# Patient Record
Sex: MALE | Race: WHITE | NOT HISPANIC OR LATINO | Employment: OTHER | URBAN - NONMETROPOLITAN AREA
[De-identification: names, ages, dates, MRNs, and addresses within clinical notes are randomized per-mention and may not be internally consistent; named-entity substitution may affect disease eponyms.]

---

## 2021-10-11 ENCOUNTER — OFFICE VISIT (OUTPATIENT)
Dept: UROLOGY | Facility: CLINIC | Age: 66
End: 2021-10-11

## 2021-10-11 VITALS
HEIGHT: 71 IN | DIASTOLIC BLOOD PRESSURE: 80 MMHG | BODY MASS INDEX: 29.96 KG/M2 | SYSTOLIC BLOOD PRESSURE: 150 MMHG | WEIGHT: 214 LBS

## 2021-10-11 DIAGNOSIS — R35.0 FREQUENCY OF MICTURITION: Primary | ICD-10-CM

## 2021-10-11 DIAGNOSIS — R33.9 INCOMPLETE BLADDER EMPTYING: ICD-10-CM

## 2021-10-11 DIAGNOSIS — N52.9 ERECTILE DYSFUNCTION, UNSPECIFIED ERECTILE DYSFUNCTION TYPE: ICD-10-CM

## 2021-10-11 LAB
BILIRUB BLD-MCNC: NEGATIVE MG/DL
CLARITY, POC: CLEAR
COLOR UR: YELLOW
EXPIRATION DATE: NORMAL
GLUCOSE UR STRIP-MCNC: NEGATIVE MG/DL
KETONES UR QL: NEGATIVE
LEUKOCYTE EST, POC: NEGATIVE
Lab: NORMAL
NITRITE UR-MCNC: NEGATIVE MG/ML
PH UR: 6 [PH] (ref 5–8)
PROT UR STRIP-MCNC: NEGATIVE MG/DL
RBC # UR STRIP: NEGATIVE /UL
SP GR UR: 1.01 (ref 1–1.03)
UROBILINOGEN UR QL: NORMAL

## 2021-10-11 PROCEDURE — 81003 URINALYSIS AUTO W/O SCOPE: CPT | Performed by: UROLOGY

## 2021-10-11 PROCEDURE — 99203 OFFICE O/P NEW LOW 30 MIN: CPT | Performed by: UROLOGY

## 2021-10-11 PROCEDURE — 51798 US URINE CAPACITY MEASURE: CPT | Performed by: UROLOGY

## 2021-10-11 RX ORDER — TOPIRAMATE 50 MG/1
TABLET, FILM COATED ORAL
COMMUNITY
Start: 2021-08-11

## 2021-10-11 RX ORDER — TAMSULOSIN HYDROCHLORIDE 0.4 MG/1
2 CAPSULE ORAL NIGHTLY
COMMUNITY
Start: 2021-07-14 | End: 2021-10-11 | Stop reason: SDUPTHER

## 2021-10-11 RX ORDER — FINASTERIDE 5 MG/1
5 TABLET, FILM COATED ORAL DAILY
Qty: 90 TABLET | Refills: 3 | Status: SHIPPED | OUTPATIENT
Start: 2021-10-11 | End: 2022-10-06

## 2021-10-11 RX ORDER — LEVOTHYROXINE SODIUM 175 UG/1
175 TABLET ORAL DAILY
COMMUNITY
Start: 2021-10-04

## 2021-10-11 RX ORDER — FINASTERIDE 5 MG/1
5 TABLET, FILM COATED ORAL DAILY
COMMUNITY
Start: 2021-07-14 | End: 2021-10-11 | Stop reason: SDUPTHER

## 2021-10-11 RX ORDER — SUMATRIPTAN 100 MG/1
TABLET, FILM COATED ORAL AS NEEDED
COMMUNITY
Start: 2021-07-16

## 2021-10-11 RX ORDER — TAMSULOSIN HYDROCHLORIDE 0.4 MG/1
2 CAPSULE ORAL NIGHTLY
Qty: 180 CAPSULE | Refills: 3 | Status: SHIPPED | OUTPATIENT
Start: 2021-10-11 | End: 2022-10-06

## 2021-10-11 RX ORDER — TADALAFIL 20 MG/1
20 TABLET ORAL AS NEEDED
Qty: 20 TABLET | Refills: 3 | Status: ON HOLD | OUTPATIENT
Start: 2021-10-11 | End: 2022-10-20

## 2021-10-11 NOTE — PROGRESS NOTES
LUTS Male Office Visit      Patient Name: Ivan Serrano  : 1955   MRN: [id number]     Chief Complaint:  Lower Urinary Tract Symptoms.   Chief Complaint   Patient presents with   • Benign Prostatic Hypertrophy     New pt        Referring Provider: No ref. provider found    History of Present Illness: Mr. Serrano is a 66 y.o. male with history of lower urinary tract symptoms. He underwent Urolift with me in 2019.  He did well initially but had what is likely a pull through.  He had irritative and obstructive symptoms again.  He then started on myrbetriq, tamsulosin, and finasteride.  This combination has worked well for him.  During his last visit with me at  were were debating a TURP.  However, he reports he is doing so much better and he would like to keep his meds up for now.     Of note, he was found to have a bleeding disorder and was advised to take Amicar prior to any procedures.       Previous treatments include: Tamsulosin, finasteride, UROLIFT.     Subjective      Review of System: Review of Systems   Constitutional: Negative for chills, fatigue, fever and unexpected weight change.   HENT: Negative for congestion, rhinorrhea and sore throat.    Eyes: Negative for visual disturbance.   Respiratory: Negative for apnea, cough, chest tightness and shortness of breath.    Cardiovascular: Negative for chest pain.   Gastrointestinal: Negative for abdominal pain, constipation, diarrhea, nausea and vomiting.   Endocrine: Negative for polydipsia and polyuria.   Genitourinary: Negative for difficulty urinating, dysuria, enuresis, flank pain, frequency, genital sores, hematuria and urgency.   Musculoskeletal: Negative for gait problem.   Skin: Negative for rash and wound.   Allergic/Immunologic: Negative for immunocompromised state.   Neurological: Negative for dizziness, tremors, syncope, weakness and light-headedness.   Hematological: Does not bruise/bleed easily.      I have reviewed the ROS  documented by my clinical staff, I have updated appropriately and I agree. Susanne Koch MD    Past Medical History:  Past Medical History:   Diagnosis Date   • BPH (benign prostatic hyperplasia)    • Chronic prostatitis    • Urgency of urination        Past Surgical History:  History reviewed. No pertinent surgical history.    Medications:    Current Outpatient Medications:   •  finasteride (PROSCAR) 5 MG tablet, Take 1 tablet by mouth Daily for 360 days., Disp: 90 tablet, Rfl: 3  •  levothyroxine (SYNTHROID, LEVOTHROID) 175 MCG tablet, Take 175 mcg by mouth Daily., Disp: , Rfl:   •  SUMAtriptan (IMITREX) 100 MG tablet, As Needed., Disp: , Rfl:   •  tamsulosin (FLOMAX) 0.4 MG capsule 24 hr capsule, Take 2 capsules by mouth Every Night for 360 days., Disp: 180 capsule, Rfl: 3  •  topiramate (TOPAMAX) 50 MG tablet, 100 mg am 50 pm, Disp: , Rfl:   •  Mirabegron ER (MYRBETRIQ) 50 MG tablet sustained-release 24 hour 24 hr tablet, Take 50 mg by mouth Daily for 360 days., Disp: 90 tablet, Rfl: 4  •  tadalafil (Cialis) 20 MG tablet, Take 1 tablet by mouth As Needed for Erectile Dysfunction for up to 90 days., Disp: 20 tablet, Rfl: 3    Allergies:  No Known Allergies    Social History:  Social History     Socioeconomic History   • Marital status: Unknown   Tobacco Use   • Smoking status: Never Smoker   • Smokeless tobacco: Never Used   Substance and Sexual Activity   • Alcohol use: Never   • Drug use: Never       Family History:  Family History   Problem Relation Age of Onset   • No Known Problems Father    • No Known Problems Mother        IPSS Questionnaire (AUA-7):  Over the past month…    1)  Incomplete Emptying  How often have you had a sensation of not emptying your bladder?  1 - Less than 1 time in 5   2)  Frequency  How often have you had to urinate less than every two hours? 2 - Less than half the time   3)  Intermittency  How often have you found you stopped and started again several times when you urinated?  3  "- About half the time   4) Urgency  How often have you found it difficult to postpone urination?  2 - Less than half the time   5) Weak Stream  How often have you had a weak urinary stream?  3 - About half the time   6) Straining  How often have you had to push or strain to begin urination?  0 - Not at all   7) Nocturia  How many times did you typically get up at night to urinate?  1 - 1 time   Total Score:  12   The International Prostate Symptom Score (IPSS) is used to screen, diagnose, track symptoms of benign prostatic hyperplasia (BPH).    0-7 pts (Mild Symptoms)  / 8-19 pts (Moderate) / 20-35 (Severe)    Quality of life due to urinary symptoms:  If you were to spend the rest of your life with your urinary condition the way it is now, how would you feel about that? 1-PleasedP   Urine Leakage (Incontinence) 0-No Leakage       Post void residual bladder scan:   0 mL     Objective     Physical Exam:   Vital Signs:   Vitals:    10/11/21 1333   BP: 150/80   Weight: 97.1 kg (214 lb)   Height: 180.3 cm (71\")     Body mass index is 29.85 kg/m².     Physical Exam  Vitals and nursing note reviewed.   Constitutional:       General: He is awake. He is not in acute distress.     Appearance: Normal appearance. He is well-developed.   HENT:      Head: Normocephalic and atraumatic.      Right Ear: External ear normal.      Left Ear: External ear normal.      Nose: Nose normal.   Eyes:      Conjunctiva/sclera: Conjunctivae normal.   Pulmonary:      Effort: Pulmonary effort is normal.   Abdominal:      General: There is no distension.      Palpations: Abdomen is soft. There is no mass.      Tenderness: There is no abdominal tenderness. There is no right CVA tenderness, left CVA tenderness, guarding or rebound.      Hernia: No hernia is present. There is no hernia in the left inguinal area or right inguinal area.   Genitourinary:     Pubic Area: No rash.       Rectum: No mass or tenderness. Normal anal tone.   Musculoskeletal:    "   Cervical back: Normal range of motion.   Lymphadenopathy:      Lower Body: No right inguinal adenopathy. No left inguinal adenopathy.   Skin:     General: Skin is warm.   Neurological:      General: No focal deficit present.      Mental Status: He is alert and oriented to person, place, and time.   Psychiatric:         Behavior: Behavior normal. Behavior is cooperative.         Labs:   No results found for: PSA    Brief Urine Lab Results  (Last result in the past 365 days)      Color   Clarity   Blood   Leuk Est   Nitrite   Protein   CREAT   Urine HCG        10/11/21 1345 Yellow   Clear   Negative   Negative   Negative   Negative                      No results found for: GLUCOSE, CALCIUM, NA, K, CO2, CL, BUN, CREATININE, EGFRIFAFRI, EGFRIFNONA, BCR, ANIONGAP    No results found for: WBC, HGB, HCT, MCV, PLT    Images:   No Images in the past 120 days found..    Measures:   Tobacco:   Ivan Serrano  reports that he has never smoked. He has never used smokeless tobacco.  No tobacco use.t.    Urine Incontinence: ( NOUI)  Urge incontinence controlled with myrbetriq     Assessment / Plan      Assessment:  Mr. Serrano is a 66 y.o. male who presented today with lower urinary tract symptoms.  He underwent UroLift in November 2019.  Since then he has been on tamsulosin, finasteride, and Myrbetriq.  He also underwent an MRI of the prostate and was found to have a 96 g prostate based off of MRI volume.  He has done very well on his medications and is very happy with his symptoms as of now.  He would like to avoid surgery at this point.  I have renewed his medications and will have him follow-up in 6 months.  If he decides to change his mind or if his symptoms change he will call and come back sooner.  I will start him on Cialis for ED.      Diagnoses and all orders for this visit:    1. Frequency of micturition (Primary)  -     POC Urinalysis Dipstick, Automated  -     Discontinue: Mirabegron ER (MYRBETRIQ) 50 MG tablet  sustained-release 24 hour 24 hr tablet; Take 50 mg by mouth Daily for 360 days.  Dispense: 90 tablet; Refill: 3    2. Incomplete bladder emptying  -     Bladder Scan  -     tadalafil (Cialis) 20 MG tablet; Take 1 tablet by mouth As Needed for Erectile Dysfunction for up to 90 days.  Dispense: 20 tablet; Refill: 3  -     tamsulosin (FLOMAX) 0.4 MG capsule 24 hr capsule; Take 2 capsules by mouth Every Night for 360 days.  Dispense: 180 capsule; Refill: 3  -     Discontinue: Mirabegron ER (MYRBETRIQ) 50 MG tablet sustained-release 24 hour 24 hr tablet; Take 50 mg by mouth Daily for 360 days.  Dispense: 90 tablet; Refill: 3  -     finasteride (PROSCAR) 5 MG tablet; Take 1 tablet by mouth Daily for 360 days.  Dispense: 90 tablet; Refill: 3    3. Erectile dysfunction, unspecified erectile dysfunction type  -     tadalafil (Cialis) 20 MG tablet; Take 1 tablet by mouth As Needed for Erectile Dysfunction for up to 90 days.  Dispense: 20 tablet; Refill: 3         Follow Up:   Return in about 6 months (around 4/11/2022) for Recheck.    I spent approximately 30 minutes providing clinical care for this patient; including review of patient's chart and provider documentation, face to face time spent with patient in examination room (obtaining history, performing physical exam, discussing diagnosis and management options), placing orders, and completing patient documentation.     Susanne Koch MD  [facility]

## 2021-10-14 ENCOUNTER — TELEPHONE (OUTPATIENT)
Dept: UROLOGY | Facility: CLINIC | Age: 66
End: 2021-10-14

## 2021-10-14 DIAGNOSIS — R35.0 FREQUENCY OF MICTURITION: ICD-10-CM

## 2021-10-14 DIAGNOSIS — R33.9 INCOMPLETE BLADDER EMPTYING: ICD-10-CM

## 2022-04-27 ENCOUNTER — OFFICE VISIT (OUTPATIENT)
Dept: UROLOGY | Facility: CLINIC | Age: 67
End: 2022-04-27

## 2022-04-27 VITALS — BODY MASS INDEX: 29.96 KG/M2 | WEIGHT: 214 LBS | HEIGHT: 71 IN

## 2022-04-27 DIAGNOSIS — R33.9 INCOMPLETE BLADDER EMPTYING: Primary | ICD-10-CM

## 2022-04-27 DIAGNOSIS — R35.0 FREQUENCY OF MICTURITION: ICD-10-CM

## 2022-04-27 LAB
BILIRUB BLD-MCNC: NEGATIVE MG/DL
CLARITY, POC: CLEAR
COLOR UR: YELLOW
EXPIRATION DATE: NORMAL
GLUCOSE UR STRIP-MCNC: NEGATIVE MG/DL
KETONES UR QL: NEGATIVE
LEUKOCYTE EST, POC: NEGATIVE
Lab: NORMAL
NITRITE UR-MCNC: NEGATIVE MG/ML
PH UR: 7.5 [PH] (ref 5–8)
PROT UR STRIP-MCNC: NEGATIVE MG/DL
RBC # UR STRIP: NEGATIVE /UL
SP GR UR: 1.01 (ref 1–1.03)
UROBILINOGEN UR QL: NORMAL

## 2022-04-27 PROCEDURE — 51798 US URINE CAPACITY MEASURE: CPT | Performed by: UROLOGY

## 2022-04-27 PROCEDURE — 81003 URINALYSIS AUTO W/O SCOPE: CPT | Performed by: UROLOGY

## 2022-04-27 PROCEDURE — 99215 OFFICE O/P EST HI 40 MIN: CPT | Performed by: UROLOGY

## 2022-04-27 RX ORDER — SOLIFENACIN SUCCINATE 5 MG/1
5 TABLET, FILM COATED ORAL DAILY
Qty: 30 TABLET | Refills: 2 | Status: SHIPPED | OUTPATIENT
Start: 2022-04-27 | End: 2022-05-10 | Stop reason: SDUPTHER

## 2022-04-27 NOTE — PROGRESS NOTES
Follow Up Office Visit      Patient Name: Ivan Serrano  : 1955   MRN: [id number]     Chief Complaint:    Chief Complaint   Patient presents with   • Benign Prostatic Hypertrophy     6 month f/u       Referring Provider: No ref. provider found    History of Present Illness: Ivan Serrano is a 67 y.o. male who presents today for follow up of lower urinary tract symptoms.  He reports he is overall doing well but has noticed his urgency has gotten worse.  He reports he goes to the bathroom once/hour.  He reports the myrbetriq has helped tremendously.  No dysuria or gross hematuria.  He has occasional urge incontinence.  No pads.   He had a PSA checked 2021 and was 1.79.  This was on finasteride.    He had an MRI of the prostate which showed a 96g prostate.    He has had 2 negative biopsies in the past.    His obstructive symptoms are improved and he is doing well from that standpoint.     Subjective      Review of System: Review of Systems   Constitutional: Negative for chills and fever.   HENT: Negative for sinus pain.    Respiratory: Negative for cough.    Cardiovascular: Negative for leg swelling.   Gastrointestinal: Negative for abdominal pain.   Genitourinary: Positive for frequency and urgency. Negative for dysuria and hematuria.   Musculoskeletal: Negative for back pain.   Neurological: Negative for headaches.   Psychiatric/Behavioral: The patient is not nervous/anxious.       I have reviewed the ROS documented by my clinical staff, I have updated appropriately and I agree. Susanne Koch MD    I have reviewed and the following portions of the patient's history were updated as appropriate: past family history, past medical history, past social history, past surgical history and problem list.    Past Medical History:   Past Medical History:   Diagnosis Date   • BPH (benign prostatic hyperplasia)    • Chronic prostatitis    • Urgency of urination        Past Surgical History:  History reviewed. No  pertinent surgical history.    Family History:   family history includes No Known Problems in his father and mother.   Otherwise pertinent FHx was reviewed and not pertinent to current issue.    Social History:    reports that he has never smoked. He has never used smokeless tobacco. He reports that he does not drink alcohol and does not use drugs.    Medications:     Current Outpatient Medications:   •  finasteride (PROSCAR) 5 MG tablet, Take 1 tablet by mouth Daily for 360 days., Disp: 90 tablet, Rfl: 3  •  levothyroxine (SYNTHROID, LEVOTHROID) 175 MCG tablet, Take 175 mcg by mouth Daily., Disp: , Rfl:   •  Mirabegron ER (MYRBETRIQ) 50 MG tablet sustained-release 24 hour 24 hr tablet, Take 50 mg by mouth Daily for 360 days., Disp: 90 tablet, Rfl: 4  •  SUMAtriptan (IMITREX) 100 MG tablet, As Needed., Disp: , Rfl:   •  tamsulosin (FLOMAX) 0.4 MG capsule 24 hr capsule, Take 2 capsules by mouth Every Night for 360 days., Disp: 180 capsule, Rfl: 3  •  topiramate (TOPAMAX) 50 MG tablet, 100 mg am 50 pm, Disp: , Rfl:   •  solifenacin (VESICARE) 5 MG tablet, Take 1 tablet by mouth Daily., Disp: 30 tablet, Rfl: 2  •  tadalafil (Cialis) 20 MG tablet, Take 1 tablet by mouth As Needed for Erectile Dysfunction for up to 90 days., Disp: 20 tablet, Rfl: 3    Allergies:   No Known Allergies    IPSS Questionnaire (AUA-7):  Over the past month…    1)  Incomplete Emptying  How often have you had a sensation of not emptying your bladder?  1 - Less than 1 time in 5   2)  Frequency  How often have you had to urinate less than every two hours? 5 - Almost always   3)  Intermittency  How often have you found you stopped and started again several times when you urinated?  2 - Less than half the time   4) Urgency  How often have you found it difficult to postpone urination?  5 - Almost always   5) Weak Stream  How often have you had a weak urinary stream?  1 - Less than 1 time in 5   6) Straining  How often have you had to push or strain  "to begin urination?  0 - Not at all   7) Nocturia  How many times did you typically get up at night to urinate?  1 - 1 time   Total Score:  15   The International Prostate Symptom Score (IPSS) is used to screen, diagnose, track symptoms of benign prostatic hyperplasia (BPH).    0-7 pts (Mild Symptoms)  / 8-19 pts (Moderate) / 20-35 (Severe)    Quality of life due to urinary symptoms:  If you were to spend the rest of your life with your urinary condition the way it is now, how would you feel about that? 3-Mixed   Urine Leakage (Incontinence) 1-Mild (A few drops a day, no pad use)       Post void residual bladder scan:   1 mL     Objective     Physical Exam:   Vital Signs:   Vitals:    04/27/22 1331   Weight: 97.1 kg (214 lb)   Height: 180.3 cm (71\")     Body mass index is 29.85 kg/m².     Physical Exam  Vitals and nursing note reviewed.   Constitutional:       General: He is awake. He is not in acute distress.     Appearance: Normal appearance. He is well-developed. He is obese.   HENT:      Head: Normocephalic and atraumatic.      Right Ear: External ear normal.      Left Ear: External ear normal.      Nose: Nose normal.   Eyes:      Conjunctiva/sclera: Conjunctivae normal.   Pulmonary:      Effort: Pulmonary effort is normal.   Abdominal:      General: There is no distension.      Palpations: Abdomen is soft. There is no mass.      Tenderness: There is no abdominal tenderness. There is no right CVA tenderness, left CVA tenderness, guarding or rebound.      Hernia: No hernia is present. There is no hernia in the left inguinal area or right inguinal area.   Genitourinary:     Pubic Area: No rash.       Rectum: No mass or tenderness. Normal anal tone.   Musculoskeletal:      Cervical back: Normal range of motion.   Lymphadenopathy:      Lower Body: No right inguinal adenopathy. No left inguinal adenopathy.   Skin:     General: Skin is warm.   Neurological:      General: No focal deficit present.      Mental Status: " He is alert and oriented to person, place, and time.   Psychiatric:         Behavior: Behavior normal. Behavior is cooperative.         Labs:   Brief Urine Lab Results  (Last result in the past 365 days)      Color   Clarity   Blood   Leuk Est   Nitrite   Protein   CREAT   Urine HCG        04/27/22 1446 Yellow   Clear   Negative   Negative   Negative   Negative                      Lab Results   Component Value Date    GLUCOSE 168 (H) 01/04/2022    CALCIUM 9.2 01/13/2022     01/13/2022    K 4.5 01/13/2022    CO2 26 01/13/2022     01/13/2022    BUN 18 01/13/2022    CREATININE 0.97 01/13/2022    EGFRIFAFRI >60 01/13/2022    EGFRIFNONA >60 01/13/2022    BCR 19 01/13/2022    ANIONGAP 8 01/13/2022       Lab Results   Component Value Date    WBC 6.43 01/13/2022    HGB 12.4 (L) 01/13/2022    HCT 38.0 (L) 01/13/2022    MCV 89 01/13/2022     01/13/2022       Lab Results   Component Value Date    PSA 1.79 09/21/2021       Images:   XR Chest 1 View    Result Date: 1/4/2022  Stable exam. CRITICAL RESULT:   No. COMMUNICATION: Per this written report. Signed by Roman Coleman on  1/4/2022 8:52 AM    XR Chest 1 View    Result Date: 1/2/2022  Persistent views bilateral lung airspace disease, appears to be worsening. CRITICAL RESULT:   No. COMMUNICATION: Per this written report. Signed by Brayan Terrazas on  1/2/2022 11:15 AM    XR Chest 1 View    Result Date: 12/31/2021  Bilateral diffuse multifocal hazy opacities, representing infectious process. Small left pleural effusion. CRITICAL RESULT:   No. COMMUNICATION: Per this written report. Dictated by Joyce Julio on 12/31/2021 2:13 AM By electronically signing this report, I, the attending physician, attest that I have personally reviewed the images/data for the above examination(s) and agree with the final edited report. Signed by Quintin Garduno on  12/31/2021 2:54 AM    CT Angiogram Chest Pulmonary Embolism    Result Date: 12/31/2021  No pulmonary embolism.  Diffuse multifocal bilateral airspace disease, compatible with infectious process, such as Covid. CRITICAL RESULT:   No. COMMUNICATION: Per this written report. Signed by Brayan Terrazas on  12/31/2021 10:18 AM      Pathology:      Measures:   Tobacco:   Ivan Serrano  reports that he has never smoked. He has never used smokeless tobacco..     Urine Incontinence: Patient reports that he is not currently experiencing any symptoms of urinary incontinence.        Assessment / Plan      Assessment/Plan:   67 y.o. male who presented today for follow up of his LUTS.  He is emptying his bladder well and states he is doing well from his obstructive symptoms.  I discussed his options with him today which included adding a anticholinergic for Botox for overactive bladder.  I did discuss the risks associated with this which included urinary retention.  He is emptying his bladder well and states that his stream is strong.  However, on cystoscopy he did have coaptation of his bladder.  If he is to undergo an outlet procedure I would recommend thulium enucleation given his significant bleeding risk.  After hearing the risks and benefits he would like to try an anticholinergic first.  We will start him on Vesicare 5 mg.  I will have him follow-up in 4 months in [place] to see how he is doing.  I advised him that should he be unable to void he should present to the emergency room for catheter placement and stop his Myrbetriq and Vesicare.    Diagnoses and all orders for this visit:    1. Incomplete bladder emptying (Primary)  -     Bladder Scan    2. Frequency of micturition  -     POC Urinalysis Dipstick, Automated  -     solifenacin (VESICARE) 5 MG tablet; Take 1 tablet by mouth Daily.  Dispense: 30 tablet; Refill: 2            Follow Up:   No follow-ups on file.    I spent approximately 40 minutes providing clinical care for this patient; including review of patient's chart and provider documentation, face to face time spent  with patient in examination room (obtaining history, performing physical exam, discussing diagnosis and management options), placing orders, and completing patient documentation.     Susanne Koch MD  [facility]

## 2022-05-10 ENCOUNTER — TELEPHONE (OUTPATIENT)
Dept: UROLOGY | Facility: CLINIC | Age: 67
End: 2022-05-10

## 2022-05-10 DIAGNOSIS — R35.0 FREQUENCY OF MICTURITION: ICD-10-CM

## 2022-05-10 RX ORDER — SOLIFENACIN SUCCINATE 5 MG/1
5 TABLET, FILM COATED ORAL DAILY
Qty: 90 TABLET | Refills: 1 | Status: SHIPPED | OUTPATIENT
Start: 2022-05-10 | End: 2022-05-11 | Stop reason: SDUPTHER

## 2022-05-10 NOTE — TELEPHONE ENCOUNTER
Sent in #90 on her Vesicare to [facility], routed message to Dr Koch to verify timing and to potentially put a referral

## 2022-05-10 NOTE — TELEPHONE ENCOUNTER
Caller: TIFFANY RODRIGUEZ    Relationship to patient: SELF    Best call back number: [phone]    Patient is needing: PT STATES THAT [facility] SAYS THEY STILL HAVE NOT RECEIVED THE PRESCRIPTION FOR solifenacin (VESICARE) 5 MG tablet  PT STATES HE BELIEVES PRESCRIPTIONS HAVE TO BE FOR 90 DAY SUPPLY, NOT 30.    ALSO, PT STATES DR KNAPP TOLD HIM HE WILL BE IN [place] IN 6 MONTHS AND THAT SOMEONE WOULD CONTACT HIM TO GET SCHEDULED FOR FOLLOW UP IN [place]. PROG NOTE 4/27/22 STATES SEE HIM IN [place] IN 4 MONTHS.

## 2022-05-11 DIAGNOSIS — N32.81 OAB (OVERACTIVE BLADDER): Primary | ICD-10-CM

## 2022-05-11 RX ORDER — SOLIFENACIN SUCCINATE 5 MG/1
5 TABLET, FILM COATED ORAL DAILY
Qty: 90 TABLET | Refills: 3 | Status: SHIPPED | OUTPATIENT
Start: 2022-05-11 | End: 2022-05-11

## 2022-05-11 RX ORDER — SOLIFENACIN SUCCINATE 5 MG/1
5 TABLET, FILM COATED ORAL DAILY
Qty: 90 TABLET | Refills: 3 | Status: ON HOLD | OUTPATIENT
Start: 2022-05-11 | End: 2022-10-20

## 2022-05-11 NOTE — TELEPHONE ENCOUNTER
Notified pt of Dr Ying message - LM - As soon as they make templates for me in [place] they'll call him for his appointment.

## 2022-05-12 ENCOUNTER — PRIOR AUTHORIZATION (OUTPATIENT)
Dept: UROLOGY | Facility: CLINIC | Age: 67
End: 2022-05-12

## 2022-05-12 ENCOUNTER — TELEPHONE (OUTPATIENT)
Dept: UROLOGY | Facility: CLINIC | Age: 67
End: 2022-05-12

## 2022-05-12 NOTE — TELEPHONE ENCOUNTER
Pt is not covered by the insurance thru covermymeds - pt should not need a PA for this thru his current insurance

## 2022-10-07 NOTE — H&P (VIEW-ONLY)
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    1955  Subjective:      Chief Complaint/Reason for visit:    Chief Complaint   Patient presents with   • Shortness of Breath   • Irregular Heart Beat   • Angioedema       Problem List:  1. Abnormal Echocardiogram 09/27/22, PANTERA Chahal MD revealing mildly dilated LV with EF 35 %, mild AR, trace to mjld MR.  2. Peripheral Edema/Shortness of breath, palpitations.  3. COVID 19- January 2022 - high flow O2  4. Chest Pain  5.. Hypothyroidism  6. BPH  7. Low Back Pain  8.. Migraine Headaches  9. Platelet Aggregation.  10. Obstructive Sleep Apnea- dental device.  11, Chronic Anemia    HPI: Mr. Serrano is a 67 yr old white male who presents in consultation today at the request of Kira VELÁZQUEZ for further evaluation of his abnormal echocardiogram revealing an EF of 35 %.  He was seen by Kira recently with new symptoms of lower extremity edema, palpitations and chest pain.  He was hospitalized in January with COVID and was in the ICU for 8 days.  Since his discharge he has noticed symptoms of chest pain with square dancing, shortness of breath, fatigue, lower extremity edema and daily episodes of palpitations.  He started noticing these symptoms 2 months ago and became concerned when the edema worsened.  He states that if he has chest discomfort with exertion, it resolves with rest.    Social History     Socioeconomic History   • Marital status:    Tobacco Use   • Smoking status: Never   • Smokeless tobacco: Never   Substance and Sexual Activity   • Alcohol use: Never   • Drug use: Never       Family history of CAD in his father.  Mother still living at 94.     has a past medical history of Anemia, BPH (benign prostatic hyperplasia), Chronic prostatitis, GERD (gastroesophageal reflux disease), Hypothyroidism, and Urgency of  "urination.    No Known Allergies      Current Outpatient Medications:   •  finasteride (PROSCAR) 5 MG tablet, Take 1 tablet by mouth Daily., Disp: , Rfl:   •  levothyroxine (SYNTHROID, LEVOTHROID) 175 MCG tablet, Take 175 mcg by mouth Daily., Disp: , Rfl:   •  Mirabegron ER (MYRBETRIQ) 50 MG tablet sustained-release 24 hour 24 hr tablet, Take 50 mg by mouth Daily., Disp: , Rfl:   •  solifenacin (VESICARE) 5 MG tablet, Take 1 tablet by mouth Daily for 360 days., Disp: 90 tablet, Rfl: 3  •  SUMAtriptan (IMITREX) 100 MG tablet, As Needed., Disp: , Rfl:   •  tadalafil (Cialis) 20 MG tablet, Take 1 tablet by mouth As Needed for Erectile Dysfunction for up to 90 days., Disp: 20 tablet, Rfl: 3  •  tamsulosin (FLOMAX) 0.4 MG capsule 24 hr capsule, Take 1 capsule by mouth Daily., Disp: , Rfl:   •  topiramate (TOPAMAX) 50 MG tablet, 100 mg am 50 pm, Disp: , Rfl:   •  carvedilol (COREG) 6.25 MG tablet, Take 1 tablet by mouth 2 (Two) Times a Day., Disp: 180 tablet, Rfl: 3    Review of Systems   Constitutional: Positive for malaise/fatigue.   Eyes: Negative.    Cardiovascular: Positive for dyspnea on exertion and leg swelling.   Respiratory: Positive for shortness of breath.    Endocrine: Negative.    Hematologic/Lymphatic: Negative.    Skin: Negative.    Musculoskeletal: Positive for back pain and neck pain.   Genitourinary: Positive for dysuria, frequency and hesitancy.   Neurological: Positive for headaches.   Psychiatric/Behavioral: Negative.    Allergic/Immunologic: Negative.        Vitals:    10/12/22 0955   BP: 150/82   Pulse: 75   SpO2: 98%   Weight: 99.3 kg (219 lb)   Height: 180.3 cm (71\")     Objective:       Constitutional:       Appearance: Healthy appearance. Not in distress.   HENT:      Nose: Nose normal.    Mouth/Throat:      Pharynx: Oropharynx is clear.   Pulmonary:      Effort: Pulmonary effort is normal.      Breath sounds: Normal breath sounds.   Cardiovascular:      PMI at left midclavicular line. Normal " rate. Regular rhythm. Normal S1. Normal S2.      Murmurs: There is no murmur.   Abdominal:      General: Bowel sounds are normal.      Palpations: Abdomen is soft.   Musculoskeletal: Normal range of motion.      Cervical back: Normal range of motion and neck supple. Skin:     General: Skin is warm.   Neurological:      General: No focal deficit present.      Mental Status: Alert and oriented to person, place and time.     Right spencer's test normal in the office today.    Data Review:     ECG 12 Lead    Date/Time: 10/12/2022 10:18 AM  Performed by: Wili Joshua MD  Authorized by: Wili Joshua MD   Comparison: not compared with previous ECG   Previous ECG: no previous ECG available  Rhythm: sinus rhythm  Ectopy: unifocal PVCs  Rate: normal  BPM: 75  Conduction: conduction normal  QRS axis: normal    Clinical impression: abnormal EKG               Assessment:      Diagnosis Plan   1. Abnormal echocardiogram  Case Request Cath Lab: Left Heart Cath      2. Cardiomyopathy, unspecified type (HCC)        3. Other chest pain        4. Palpitations        5. Hypertension, unspecified type        6. COVID-19          Plan:   1. Coreg 6.25 mg po bid  2. Cardiac cath in the setting of Cardiomyopathy with symptoms of chest pain,dyspnea, fatigue.  3. Continue all other medications.  4. Asa was not ordered at this time due to history of platelet aggregation.  5.  Initiate ARB/Arni at time of cardiac cath   6.  Check fasting lipid profile at time of cardiac cath   7.  Follow up pending results of cardiac cath.    Scribed for Wili Joshua MD by Christine Hollingsworth RN. 10/12/2022  11:14 EDT      I have seen and examined the patient, I have reviewed the note, discussed the case with the advance practice clinician, made necessary changes and I agree with the final note.    Wili Joshua MD  10/12/22  12:07 EDT          Please note that portions of this note may have been completed with a voice recognition program. Efforts were  made to edit the dictations, but occasionally words are mistranscribed.

## 2022-10-07 NOTE — PROGRESS NOTES
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    1955  Subjective:      Chief Complaint/Reason for visit:    Chief Complaint   Patient presents with   • Shortness of Breath   • Irregular Heart Beat   • Angioedema       Problem List:  1. Abnormal Echocardiogram 09/27/22, PANTERA Chahal MD revealing mildly dilated LV with EF 35 %, mild AR, trace to mjld MR.  2. Peripheral Edema/Shortness of breath, palpitations.  3. COVID 19- January 2022 - high flow O2  4. Chest Pain  5.. Hypothyroidism  6. BPH  7. Low Back Pain  8.. Migraine Headaches  9. Platelet Aggregation.  10. Obstructive Sleep Apnea- dental device.  11, Chronic Anemia    HPI: Mr. Serrano is a 67 yr old white male who presents in consultation today at the request of Kira VELÁZQUEZ for further evaluation of his abnormal echocardiogram revealing an EF of 35 %.  He was seen by Kira recently with new symptoms of lower extremity edema, palpitations and chest pain.  He was hospitalized in January with COVID and was in the ICU for 8 days.  Since his discharge he has noticed symptoms of chest pain with square dancing, shortness of breath, fatigue, lower extremity edema and daily episodes of palpitations.  He started noticing these symptoms 2 months ago and became concerned when the edema worsened.  He states that if he has chest discomfort with exertion, it resolves with rest.    Social History     Socioeconomic History   • Marital status:    Tobacco Use   • Smoking status: Never   • Smokeless tobacco: Never   Substance and Sexual Activity   • Alcohol use: Never   • Drug use: Never       Family history of CAD in his father.  Mother still living at 94.     has a past medical history of Anemia, BPH (benign prostatic hyperplasia), Chronic prostatitis, GERD (gastroesophageal reflux disease), Hypothyroidism, and Urgency of  "urination.    No Known Allergies      Current Outpatient Medications:   •  finasteride (PROSCAR) 5 MG tablet, Take 1 tablet by mouth Daily., Disp: , Rfl:   •  levothyroxine (SYNTHROID, LEVOTHROID) 175 MCG tablet, Take 175 mcg by mouth Daily., Disp: , Rfl:   •  Mirabegron ER (MYRBETRIQ) 50 MG tablet sustained-release 24 hour 24 hr tablet, Take 50 mg by mouth Daily., Disp: , Rfl:   •  solifenacin (VESICARE) 5 MG tablet, Take 1 tablet by mouth Daily for 360 days., Disp: 90 tablet, Rfl: 3  •  SUMAtriptan (IMITREX) 100 MG tablet, As Needed., Disp: , Rfl:   •  tadalafil (Cialis) 20 MG tablet, Take 1 tablet by mouth As Needed for Erectile Dysfunction for up to 90 days., Disp: 20 tablet, Rfl: 3  •  tamsulosin (FLOMAX) 0.4 MG capsule 24 hr capsule, Take 1 capsule by mouth Daily., Disp: , Rfl:   •  topiramate (TOPAMAX) 50 MG tablet, 100 mg am 50 pm, Disp: , Rfl:   •  carvedilol (COREG) 6.25 MG tablet, Take 1 tablet by mouth 2 (Two) Times a Day., Disp: 180 tablet, Rfl: 3    Review of Systems   Constitutional: Positive for malaise/fatigue.   Eyes: Negative.    Cardiovascular: Positive for dyspnea on exertion and leg swelling.   Respiratory: Positive for shortness of breath.    Endocrine: Negative.    Hematologic/Lymphatic: Negative.    Skin: Negative.    Musculoskeletal: Positive for back pain and neck pain.   Genitourinary: Positive for dysuria, frequency and hesitancy.   Neurological: Positive for headaches.   Psychiatric/Behavioral: Negative.    Allergic/Immunologic: Negative.        Vitals:    10/12/22 0955   BP: 150/82   Pulse: 75   SpO2: 98%   Weight: 99.3 kg (219 lb)   Height: 180.3 cm (71\")     Objective:       Constitutional:       Appearance: Healthy appearance. Not in distress.   HENT:      Nose: Nose normal.    Mouth/Throat:      Pharynx: Oropharynx is clear.   Pulmonary:      Effort: Pulmonary effort is normal.      Breath sounds: Normal breath sounds.   Cardiovascular:      PMI at left midclavicular line. Normal " rate. Regular rhythm. Normal S1. Normal S2.      Murmurs: There is no murmur.   Abdominal:      General: Bowel sounds are normal.      Palpations: Abdomen is soft.   Musculoskeletal: Normal range of motion.      Cervical back: Normal range of motion and neck supple. Skin:     General: Skin is warm.   Neurological:      General: No focal deficit present.      Mental Status: Alert and oriented to person, place and time.     Right spencer's test normal in the office today.    Data Review:     ECG 12 Lead    Date/Time: 10/12/2022 10:18 AM  Performed by: Wili Joshua MD  Authorized by: Wili Joshua MD   Comparison: not compared with previous ECG   Previous ECG: no previous ECG available  Rhythm: sinus rhythm  Ectopy: unifocal PVCs  Rate: normal  BPM: 75  Conduction: conduction normal  QRS axis: normal    Clinical impression: abnormal EKG               Assessment:      Diagnosis Plan   1. Abnormal echocardiogram  Case Request Cath Lab: Left Heart Cath      2. Cardiomyopathy, unspecified type (HCC)        3. Other chest pain        4. Palpitations        5. Hypertension, unspecified type        6. COVID-19          Plan:   1. Coreg 6.25 mg po bid  2. Cardiac cath in the setting of Cardiomyopathy with symptoms of chest pain,dyspnea, fatigue.  3. Continue all other medications.  4. Asa was not ordered at this time due to history of platelet aggregation.  5.  Initiate ARB/Arni at time of cardiac cath   6.  Check fasting lipid profile at time of cardiac cath   7.  Follow up pending results of cardiac cath.    Scribed for Wili Joshua MD by Christine Hollingsworth RN. 10/12/2022  11:14 EDT      I have seen and examined the patient, I have reviewed the note, discussed the case with the advance practice clinician, made necessary changes and I agree with the final note.    Wili Joshua MD  10/12/22  12:07 EDT          Please note that portions of this note may have been completed with a voice recognition program. Efforts were  made to edit the dictations, but occasionally words are mistranscribed.

## 2022-10-12 ENCOUNTER — OFFICE VISIT (OUTPATIENT)
Dept: CARDIOLOGY | Facility: CLINIC | Age: 67
End: 2022-10-12

## 2022-10-12 VITALS
HEART RATE: 75 BPM | OXYGEN SATURATION: 98 % | SYSTOLIC BLOOD PRESSURE: 150 MMHG | HEIGHT: 71 IN | DIASTOLIC BLOOD PRESSURE: 82 MMHG | BODY MASS INDEX: 30.66 KG/M2 | WEIGHT: 219 LBS

## 2022-10-12 DIAGNOSIS — R00.2 PALPITATIONS: ICD-10-CM

## 2022-10-12 DIAGNOSIS — I10 HYPERTENSION, UNSPECIFIED TYPE: ICD-10-CM

## 2022-10-12 DIAGNOSIS — R07.89 OTHER CHEST PAIN: ICD-10-CM

## 2022-10-12 DIAGNOSIS — U07.1 COVID-19: ICD-10-CM

## 2022-10-12 DIAGNOSIS — I42.9 CARDIOMYOPATHY, UNSPECIFIED TYPE: ICD-10-CM

## 2022-10-12 DIAGNOSIS — R93.1 ABNORMAL ECHOCARDIOGRAM: Primary | ICD-10-CM

## 2022-10-12 PROCEDURE — 99204 OFFICE O/P NEW MOD 45 MIN: CPT | Performed by: INTERNAL MEDICINE

## 2022-10-12 PROCEDURE — 93000 ELECTROCARDIOGRAM COMPLETE: CPT | Performed by: INTERNAL MEDICINE

## 2022-10-12 RX ORDER — CARVEDILOL 6.25 MG/1
6.25 TABLET ORAL 2 TIMES DAILY
Qty: 180 TABLET | Refills: 3 | Status: SHIPPED | OUTPATIENT
Start: 2022-10-12 | End: 2022-12-07 | Stop reason: DRUGHIGH

## 2022-10-12 RX ORDER — FINASTERIDE 5 MG/1
5 TABLET, FILM COATED ORAL DAILY
COMMUNITY
End: 2022-11-09 | Stop reason: SDUPTHER

## 2022-10-12 RX ORDER — TAMSULOSIN HYDROCHLORIDE 0.4 MG/1
1 CAPSULE ORAL DAILY
COMMUNITY

## 2022-10-20 ENCOUNTER — HOSPITAL ENCOUNTER (OUTPATIENT)
Facility: HOSPITAL | Age: 67
Setting detail: HOSPITAL OUTPATIENT SURGERY
Discharge: HOME | End: 2022-10-20
Attending: INTERNAL MEDICINE | Admitting: INTERNAL MEDICINE

## 2022-10-20 VITALS
SYSTOLIC BLOOD PRESSURE: 169 MMHG | BODY MASS INDEX: 30.97 KG/M2 | TEMPERATURE: 97.6 F | OXYGEN SATURATION: 95 % | DIASTOLIC BLOOD PRESSURE: 78 MMHG | HEART RATE: 67 BPM | HEIGHT: 71 IN | WEIGHT: 221.2 LBS | RESPIRATION RATE: 16 BRPM

## 2022-10-20 DIAGNOSIS — R93.1 ABNORMAL ECHOCARDIOGRAM: ICD-10-CM

## 2022-10-20 LAB
ALBUMIN SERPL-MCNC: 4 G/DL (ref 3.5–5.2)
ALBUMIN/GLOB SERPL: 1.7 G/DL
ALP SERPL-CCNC: 84 U/L (ref 39–117)
ALT SERPL W P-5'-P-CCNC: 16 U/L (ref 1–41)
ANION GAP SERPL CALCULATED.3IONS-SCNC: 8 MMOL/L (ref 5–15)
AST SERPL-CCNC: 13 U/L (ref 1–40)
BILIRUB SERPL-MCNC: 0.5 MG/DL (ref 0–1.2)
BUN SERPL-MCNC: 16 MG/DL (ref 8–23)
BUN/CREAT SERPL: 16 (ref 7–25)
CALCIUM SPEC-SCNC: 9.2 MG/DL (ref 8.6–10.5)
CATH EF ESTIMATED: 30 %
CHLORIDE SERPL-SCNC: 109 MMOL/L (ref 98–107)
CHOLEST SERPL-MCNC: 168 MG/DL (ref 0–200)
CO2 SERPL-SCNC: 24 MMOL/L (ref 22–29)
CREAT BLDA-MCNC: 1.1 MG/DL (ref 0.6–1.3)
CREAT SERPL-MCNC: 1 MG/DL (ref 0.76–1.27)
DEPRECATED RDW RBC AUTO: 39.3 FL (ref 37–54)
EGFRCR SERPLBLD CKD-EPI 2021: 81.5 ML/MIN/1.73
ERYTHROCYTE [DISTWIDTH] IN BLOOD BY AUTOMATED COUNT: 12.2 % (ref 12.3–15.4)
GLOBULIN UR ELPH-MCNC: 2.4 GM/DL
GLUCOSE SERPL-MCNC: 115 MG/DL (ref 65–99)
HBA1C MFR BLD: 5.8 % (ref 4.8–5.6)
HCT VFR BLD AUTO: 38.3 % (ref 37.5–51)
HDLC SERPL-MCNC: 56 MG/DL (ref 40–60)
HGB BLD-MCNC: 13 G/DL (ref 13–17.7)
LDLC SERPL CALC-MCNC: 96 MG/DL (ref 0–100)
LDLC/HDLC SERPL: 1.7 {RATIO}
MCH RBC QN AUTO: 30 PG (ref 26.6–33)
MCHC RBC AUTO-ENTMCNC: 33.9 G/DL (ref 31.5–35.7)
MCV RBC AUTO: 88.2 FL (ref 79–97)
PLATELET # BLD AUTO: 161 10*3/MM3 (ref 140–450)
PMV BLD AUTO: 10.5 FL (ref 6–12)
POTASSIUM SERPL-SCNC: 3.9 MMOL/L (ref 3.5–5.2)
PROT SERPL-MCNC: 6.4 G/DL (ref 6–8.5)
RBC # BLD AUTO: 4.34 10*6/MM3 (ref 4.14–5.8)
SODIUM SERPL-SCNC: 141 MMOL/L (ref 136–145)
TRIGL SERPL-MCNC: 83 MG/DL (ref 0–150)
VLDLC SERPL-MCNC: 16 MG/DL (ref 5–40)
WBC NRBC COR # BLD: 7.32 10*3/MM3 (ref 3.4–10.8)

## 2022-10-20 PROCEDURE — 80053 COMPREHEN METABOLIC PANEL: CPT | Performed by: NURSE PRACTITIONER

## 2022-10-20 PROCEDURE — C1769 GUIDE WIRE: HCPCS | Performed by: INTERNAL MEDICINE

## 2022-10-20 PROCEDURE — 25010000002 MIDAZOLAM PER 1 MG: Performed by: INTERNAL MEDICINE

## 2022-10-20 PROCEDURE — 25010000002 FENTANYL CITRATE (PF) 50 MCG/ML SOLUTION: Performed by: INTERNAL MEDICINE

## 2022-10-20 PROCEDURE — 83036 HEMOGLOBIN GLYCOSYLATED A1C: CPT | Performed by: NURSE PRACTITIONER

## 2022-10-20 PROCEDURE — C1894 INTRO/SHEATH, NON-LASER: HCPCS | Performed by: INTERNAL MEDICINE

## 2022-10-20 PROCEDURE — C1760 CLOSURE DEV, VASC: HCPCS | Performed by: INTERNAL MEDICINE

## 2022-10-20 PROCEDURE — 36415 COLL VENOUS BLD VENIPUNCTURE: CPT

## 2022-10-20 PROCEDURE — 85027 COMPLETE CBC AUTOMATED: CPT | Performed by: NURSE PRACTITIONER

## 2022-10-20 PROCEDURE — 25010000002 HEPARIN (PORCINE) PER 1000 UNITS: Performed by: INTERNAL MEDICINE

## 2022-10-20 PROCEDURE — 93458 L HRT ARTERY/VENTRICLE ANGIO: CPT | Performed by: INTERNAL MEDICINE

## 2022-10-20 PROCEDURE — 80061 LIPID PANEL: CPT | Performed by: NURSE PRACTITIONER

## 2022-10-20 PROCEDURE — 0 IOPAMIDOL PER 1 ML: Performed by: INTERNAL MEDICINE

## 2022-10-20 PROCEDURE — 82565 ASSAY OF CREATININE: CPT

## 2022-10-20 RX ORDER — SODIUM CHLORIDE 0.9 % (FLUSH) 0.9 %
1-10 SYRINGE (ML) INJECTION AS NEEDED
Status: DISCONTINUED | OUTPATIENT
Start: 2022-10-20 | End: 2022-10-20 | Stop reason: HOSPADM

## 2022-10-20 RX ORDER — ASPIRIN 325 MG
325 TABLET, DELAYED RELEASE (ENTERIC COATED) ORAL DAILY
Status: DISCONTINUED | OUTPATIENT
Start: 2022-10-21 | End: 2022-10-20 | Stop reason: HOSPADM

## 2022-10-20 RX ORDER — SODIUM CHLORIDE 0.9 % (FLUSH) 0.9 %
10 SYRINGE (ML) INJECTION EVERY 12 HOURS SCHEDULED
Status: DISCONTINUED | OUTPATIENT
Start: 2022-10-20 | End: 2022-10-20 | Stop reason: HOSPADM

## 2022-10-20 RX ORDER — ASPIRIN 325 MG
325 TABLET ORAL ONCE
Status: DISCONTINUED | OUTPATIENT
Start: 2022-10-20 | End: 2022-10-20 | Stop reason: HOSPADM

## 2022-10-20 RX ORDER — SODIUM CHLORIDE 9 MG/ML
1-3 INJECTION, SOLUTION INTRAVENOUS CONTINUOUS
Status: DISCONTINUED | OUTPATIENT
Start: 2022-10-20 | End: 2022-10-20 | Stop reason: HOSPADM

## 2022-10-20 RX ORDER — ONDANSETRON 2 MG/ML
4 INJECTION INTRAMUSCULAR; INTRAVENOUS EVERY 6 HOURS PRN
Status: DISCONTINUED | OUTPATIENT
Start: 2022-10-20 | End: 2022-10-20 | Stop reason: HOSPADM

## 2022-10-20 RX ORDER — NITROGLYCERIN 0.4 MG/1
0.4 TABLET SUBLINGUAL
Status: DISCONTINUED | OUTPATIENT
Start: 2022-10-20 | End: 2022-10-20 | Stop reason: HOSPADM

## 2022-10-20 RX ORDER — HEPARIN SODIUM 1000 [USP'U]/ML
INJECTION, SOLUTION INTRAVENOUS; SUBCUTANEOUS
Status: DISCONTINUED | OUTPATIENT
Start: 2022-10-20 | End: 2022-10-20 | Stop reason: HOSPADM

## 2022-10-20 RX ORDER — LIDOCAINE HYDROCHLORIDE 10 MG/ML
INJECTION, SOLUTION EPIDURAL; INFILTRATION; INTRACAUDAL; PERINEURAL
Status: DISCONTINUED | OUTPATIENT
Start: 2022-10-20 | End: 2022-10-20 | Stop reason: HOSPADM

## 2022-10-20 RX ORDER — ACETAMINOPHEN 325 MG/1
650 TABLET ORAL EVERY 4 HOURS PRN
Status: DISCONTINUED | OUTPATIENT
Start: 2022-10-20 | End: 2022-10-20 | Stop reason: HOSPADM

## 2022-10-20 RX ORDER — NICARDIPINE HCL-0.9% SOD CHLOR 1 MG/10 ML
SYRINGE (ML) INTRAVENOUS
Status: DISCONTINUED | OUTPATIENT
Start: 2022-10-20 | End: 2022-10-20 | Stop reason: HOSPADM

## 2022-10-20 RX ORDER — MIDAZOLAM HYDROCHLORIDE 1 MG/ML
INJECTION INTRAMUSCULAR; INTRAVENOUS
Status: DISCONTINUED | OUTPATIENT
Start: 2022-10-20 | End: 2022-10-20 | Stop reason: HOSPADM

## 2022-10-20 RX ORDER — FENTANYL CITRATE 50 UG/ML
INJECTION, SOLUTION INTRAMUSCULAR; INTRAVENOUS
Status: DISCONTINUED | OUTPATIENT
Start: 2022-10-20 | End: 2022-10-20 | Stop reason: HOSPADM

## 2022-10-20 RX ADMIN — SODIUM CHLORIDE 3 ML/KG/HR: 9 INJECTION, SOLUTION INTRAVENOUS at 08:21

## 2022-10-20 NOTE — INTERVAL H&P NOTE
H&P reviewed. The patient was examined and there are no changes to the H&P.        
  H&P reviewed. The patient was examined and there are no changes to the H&P.    MELANIA Unger       
[facility]

## 2022-10-27 ENCOUNTER — OFFICE VISIT (OUTPATIENT)
Dept: UROLOGY | Facility: CLINIC | Age: 67
End: 2022-10-27

## 2022-10-27 ENCOUNTER — LAB (OUTPATIENT)
Dept: LAB | Facility: HOSPITAL | Age: 67
End: 2022-10-27

## 2022-10-27 VITALS — HEIGHT: 71 IN | WEIGHT: 221 LBS | BODY MASS INDEX: 30.94 KG/M2

## 2022-10-27 DIAGNOSIS — R97.20 ELEVATED PROSTATE SPECIFIC ANTIGEN (PSA): Primary | ICD-10-CM

## 2022-10-27 DIAGNOSIS — R97.20 ELEVATED PROSTATE SPECIFIC ANTIGEN (PSA): ICD-10-CM

## 2022-10-27 DIAGNOSIS — N40.0 BENIGN PROSTATIC HYPERPLASIA, UNSPECIFIED WHETHER LOWER URINARY TRACT SYMPTOMS PRESENT: ICD-10-CM

## 2022-10-27 LAB
BILIRUB BLD-MCNC: NEGATIVE MG/DL
CLARITY, POC: CLEAR
COLOR UR: YELLOW
EXPIRATION DATE: NORMAL
GLUCOSE UR STRIP-MCNC: NEGATIVE MG/DL
KETONES UR QL: NEGATIVE
LEUKOCYTE EST, POC: NEGATIVE
Lab: NORMAL
NITRITE UR-MCNC: NEGATIVE MG/ML
PH UR: 6 [PH] (ref 5–8)
PROT UR STRIP-MCNC: NEGATIVE MG/DL
RBC # UR STRIP: NEGATIVE /UL
SP GR UR: 1.02 (ref 1–1.03)
UROBILINOGEN UR QL: NORMAL

## 2022-10-27 PROCEDURE — 36415 COLL VENOUS BLD VENIPUNCTURE: CPT

## 2022-10-27 PROCEDURE — 84153 ASSAY OF PSA TOTAL: CPT

## 2022-10-27 PROCEDURE — 99214 OFFICE O/P EST MOD 30 MIN: CPT | Performed by: UROLOGY

## 2022-10-27 PROCEDURE — 81003 URINALYSIS AUTO W/O SCOPE: CPT | Performed by: UROLOGY

## 2022-10-27 PROCEDURE — 51798 US URINE CAPACITY MEASURE: CPT | Performed by: UROLOGY

## 2022-10-27 NOTE — PROGRESS NOTES
Follow Up Office Visit      Patient Name: Ivan Serrano  : 1955   MRN: [id number]     Chief Complaint:    Chief Complaint   Patient presents with   • Benign Prostatic Hypertrophy       Referring Provider: No ref. provider found    History of Present Illness: Ivan Serrano is a 67 y.o. male who presents today for follow up of  Frequency and urgency.  He states that his stream is still strong and is happy with the obstructive symptoms.  He had undergone a Urolift previously and was doing well.  However, it appeared he may have had a pull through.  Today he reports his obstructive symptoms are ok.  He has frequency and urgency.  He is on myrbetriq which states are helping.  He states he still has frequency and urgency however.  He is on tamsulosin and finasteride.  Last time at  we were debating a TURP.  However, he did not want to undergo TURP.  He has a bleeding disorder and he needs Amicar prior to any procedures.  His PVR is 0 mL.    He recently had COVID.  HE recently underwent a cardiac work up and was found to have an EF of 35%.  He is getting worked up for that currently.      Subjective      Review of System:   Review of Systems   Constitutional: Negative.    Eyes: Negative.    Respiratory: Positive for shortness of breath.    Cardiovascular: Positive for chest pain and leg swelling.   Gastrointestinal: Negative.    Endocrine: Negative.    Genitourinary: Positive for frequency and urgency.   Musculoskeletal: Negative.    Skin: Negative.    Allergic/Immunologic: Negative.    Neurological: Positive for headaches.   Hematological: Negative.    Psychiatric/Behavioral: Negative.       I have reviewed the ROS documented by my clinical staff, I have updated appropriately and I agree. Susanne Koch MD    I have reviewed and the following portions of the patient's history were updated as appropriate: past family history, past medical history, past social history, past surgical history and problem  list.    Past Medical History:   Past Medical History:   Diagnosis Date   • Anemia    • BPH (benign prostatic hyperplasia)    • Chronic prostatitis    • GERD (gastroesophageal reflux disease)    • Hypothyroidism    • Platelet disorder (HCC)    • Urgency of urination        Past Surgical History:  Past Surgical History:   Procedure Laterality Date   • ACHILLES TENDON REPAIR     • CARDIAC CATHETERIZATION Left 10/20/2022    Procedure: Left Heart Cath;  Surgeon: Wili Joshua MD;  [facility]: [facility];  Service: Cardiovascular;  Laterality: Left;   • CHOLECYSTECTOMY     • SEPTOPLASTY     • TONSILLECTOMY         Family History:   family history includes Heart failure in his father; No Known Problems in his mother.   Otherwise pertinent FHx was reviewed and not pertinent to current issue.    Social History:    reports that he has never smoked. He has never used smokeless tobacco. He reports that he does not drink alcohol and does not use drugs.    Medications:     Current Outpatient Medications:   •  carvedilol (COREG) 6.25 MG tablet, Take 1 tablet by mouth 2 (Two) Times a Day., Disp: 180 tablet, Rfl: 3  •  finasteride (PROSCAR) 5 MG tablet, Take 1 tablet by mouth Daily., Disp: , Rfl:   •  levothyroxine (SYNTHROID, LEVOTHROID) 175 MCG tablet, Take 175 mcg by mouth Daily., Disp: , Rfl:   •  Mirabegron ER (MYRBETRIQ) 50 MG tablet sustained-release 24 hour 24 hr tablet, Take 50 mg by mouth Daily., Disp: , Rfl:   •  sacubitril-valsartan (ENTRESTO) 24-26 MG tablet, Take 1 tablet by mouth 2 (Two) Times a Day., Disp: 180 tablet, Rfl: 1  •  SUMAtriptan (IMITREX) 100 MG tablet, As Needed., Disp: , Rfl:   •  tamsulosin (FLOMAX) 0.4 MG capsule 24 hr capsule, Take 1 capsule by mouth Daily., Disp: , Rfl:   •  topiramate (TOPAMAX) 50 MG tablet, 100 mg am 50 pm, Disp: , Rfl:     Allergies:   No Known Allergies    IPSS Questionnaire (AUA-7):  Over the past month…    1)  Incomplete Emptying  How often have you had a  sensation of not emptying your bladder?  2 - Less than half the time   2)  Frequency  How often have you had to urinate less than every two hours? 5 - Almost always   3)  Intermittency  How often have you found you stopped and started again several times when you urinated?  4 - More than half the time   4) Urgency  How often have you found it difficult to postpone urination?  4 - More than half the time   5) Weak Stream  How often have you had a weak urinary stream?  3 - About half the time   6) Straining  How often have you had to push or strain to begin urination?  2 - Less than half the time   7) Nocturia  How many times did you typically get up at night to urinate?  2 - 2 times   Total Score:  22   The International Prostate Symptom Score (IPSS) is used to screen, diagnose, track symptoms of benign prostatic hyperplasia (BPH).    0-7 pts (Mild Symptoms)  / 8-19 pts (Moderate) / 20-35 (Severe)    Quality of life due to urinary symptoms:  If you were to spend the rest of your life with your urinary condition the way it is now, how would you feel about that? 3-Mixed   Urine Leakage (Incontinence) 1-Mild (A few drops a day, no pad use)     Sexual Health Inventory for Men (REY)   Over the past 6 months:     1. How do you rate your confidence that you could get and keep an erection?  1 - Very low   2. When you had erections with sexual                                     stimulation, how often were your erections hard enough for penetration (entering your partner)?  2 - A few times (much less than half the time)   3. During sexual intercourse, how often were you able to maintain your erection after you had penetrated (entered) your partner?  1 - Almost never or never   4. During sexual intercourse, how difficult was it to maintain your erection to completion of intercourse?  1 - Extremely difficult   5. When you attempted sexual intercourse, how often was it satisfactory for you?  1 - Almost never or never    Total  "Score: 6   The Sexual Health Inventory for Men further classifies ED severity with the following breakpoints:   1-7 (Severe ED) 8-11 (Moderate ED) 12-16 (Mild to Moderate ED) 17-21 (Mild ED)      Post void residual bladder scan:   0mL     Objective     Physical Exam:   Vital Signs:   Vitals:    10/27/22 1436   Weight: 100 kg (221 lb)   Height: 180.3 cm (70.98\")   PainSc: 0-No pain     Body mass index is 30.84 kg/m².     Physical Exam  Vitals and nursing note reviewed.   Constitutional:       General: He is awake. He is not in acute distress.     Appearance: Normal appearance. He is well-developed. He is obese.   HENT:      Head: Normocephalic and atraumatic.      Right Ear: External ear normal.      Left Ear: External ear normal.      Nose: Nose normal.   Eyes:      Conjunctiva/sclera: Conjunctivae normal.   Pulmonary:      Effort: Pulmonary effort is normal.   Abdominal:      General: There is no distension.      Palpations: Abdomen is soft. There is no mass.      Tenderness: There is no abdominal tenderness. There is no right CVA tenderness, left CVA tenderness, guarding or rebound.      Hernia: No hernia is present. There is no hernia in the left inguinal area or right inguinal area.   Genitourinary:     Pubic Area: No rash.       Penis: Normal.       Testes: Normal.      Prostate: Normal.      Rectum: Normal. No mass or tenderness. Normal anal tone.   Musculoskeletal:      Cervical back: Normal range of motion.   Lymphadenopathy:      Lower Body: No right inguinal adenopathy. No left inguinal adenopathy.   Skin:     General: Skin is warm.   Neurological:      General: No focal deficit present.      Mental Status: He is alert and oriented to person, place, and time.   Psychiatric:         Behavior: Behavior normal. Behavior is cooperative.         Labs:   Brief Urine Lab Results  (Last result in the past 365 days)      Color   Clarity   Blood   Leuk Est   Nitrite   Protein   CREAT   Urine HCG        10/27/22 1444 " Yellow   Clear   Negative   Negative   Negative   Negative                      Lab Results   Component Value Date    GLUCOSE 115 (H) 10/20/2022    CALCIUM 9.2 10/20/2022     10/20/2022    K 3.9 10/20/2022    CO2 24.0 10/20/2022     (H) 10/20/2022    BUN 16 10/20/2022    CREATININE 1.00 10/20/2022    EGFRIFAFRI >60 01/13/2022    EGFRIFNONA >60 01/13/2022    BCR 16.0 10/20/2022    ANIONGAP 8.0 10/20/2022       Lab Results   Component Value Date    WBC 7.32 10/20/2022    HGB 13.0 10/20/2022    HCT 38.3 10/20/2022    MCV 88.2 10/20/2022     10/20/2022       Lab Results   Component Value Date    PSA 1.79 09/21/2021       Images:   No Images in the past 120 days found..    Measures:   Tobacco:   Ivan Serrano  reports that he has never smoked. He has never used smokeless tobacco.    Urine Incontinence: Patient reports that he is not currently experiencing any symptoms of urinary incontinence.         Assessment / Plan      Assessment/Plan:   67 y.o. male who presented today for follow up of LUTS.  He has undergone a Urolift before which has helped somewhat.  However, he is still having obstructive and irritative symptoms.  His stream has improved from before.  I discussed PNE with him today for his irritative symptoms.  I believe he would benefit from an outlet procedure as well.   He is going to think about his options.  In the meantime, he is being evaluated by cardiology.  I will have him follow up in 4 months.  I provided him with some literature today regarding [employer].      Diagnoses and all orders for this visit:    1. Elevated prostate specific antigen (PSA) (Primary)  -     PSA DIAGNOSTIC; Future    2. Benign prostatic hyperplasia, unspecified whether lower urinary tract symptoms present  -     POC Urinalysis Dipstick, Automated         Follow Up:   Return in about 4 months (around 2/27/2023) for Recheck.    I spent approximately 30 minutes providing clinical care for this patient; including  review of patient's chart and provider documentation, face to face time spent with patient in examination room (obtaining history, performing physical exam, discussing diagnosis and management options), placing orders, and completing patient documentation.     Susanne Koch MD  [facility] [place]

## 2022-10-28 LAB — PSA SERPL-MCNC: 3.93 NG/ML (ref 0–4)

## 2022-11-09 ENCOUNTER — TELEPHONE (OUTPATIENT)
Dept: UROLOGY | Facility: CLINIC | Age: 67
End: 2022-11-09

## 2022-11-09 DIAGNOSIS — N40.0 BENIGN PROSTATIC HYPERPLASIA, UNSPECIFIED WHETHER LOWER URINARY TRACT SYMPTOMS PRESENT: Primary | ICD-10-CM

## 2022-11-09 DIAGNOSIS — R33.9 INCOMPLETE BLADDER EMPTYING: ICD-10-CM

## 2022-11-09 DIAGNOSIS — N32.81 OAB (OVERACTIVE BLADDER): ICD-10-CM

## 2022-11-09 RX ORDER — FINASTERIDE 5 MG/1
5 TABLET, FILM COATED ORAL DAILY
Qty: 90 TABLET | Refills: 0 | Status: SHIPPED | OUTPATIENT
Start: 2022-11-09 | End: 2023-02-17 | Stop reason: SDUPTHER

## 2022-11-09 NOTE — TELEPHONE ENCOUNTER
Patient requesting 90 day refill for Finasteride 5 mg, sent to [facility] pharmacy.  Updated pharmacy in chart.    Dr. Koch, please advise.  Thank you.

## 2022-12-07 ENCOUNTER — OFFICE VISIT (OUTPATIENT)
Dept: CARDIOLOGY | Facility: CLINIC | Age: 67
End: 2022-12-07

## 2022-12-07 VITALS
OXYGEN SATURATION: 98 % | BODY MASS INDEX: 29.68 KG/M2 | SYSTOLIC BLOOD PRESSURE: 110 MMHG | WEIGHT: 212 LBS | HEART RATE: 87 BPM | HEIGHT: 71 IN | DIASTOLIC BLOOD PRESSURE: 68 MMHG

## 2022-12-07 DIAGNOSIS — I42.0 DILATED CARDIOMYOPATHY: Primary | ICD-10-CM

## 2022-12-07 DIAGNOSIS — I42.0 DILATED CARDIOMYOPATHY: ICD-10-CM

## 2022-12-07 DIAGNOSIS — R93.1 ABNORMAL ECHOCARDIOGRAM: Primary | ICD-10-CM

## 2022-12-07 DIAGNOSIS — I10 PRIMARY HYPERTENSION: ICD-10-CM

## 2022-12-07 DIAGNOSIS — I42.9 CARDIOMYOPATHY, UNSPECIFIED TYPE: ICD-10-CM

## 2022-12-07 PROCEDURE — 99214 OFFICE O/P EST MOD 30 MIN: CPT | Performed by: INTERNAL MEDICINE

## 2022-12-07 RX ORDER — CARVEDILOL 12.5 MG/1
12.5 TABLET ORAL 2 TIMES DAILY
Qty: 60 TABLET | Refills: 11 | Status: SHIPPED | OUTPATIENT
Start: 2022-12-07 | End: 2023-02-13 | Stop reason: SDUPTHER

## 2022-12-07 NOTE — PROGRESS NOTES
Subjective:     Encounter Date:12/07/2022    Primary Care Physician: Kira Daugherty APRN      Patient ID: Ivan Serrano is a 67 y.o. male.    Chief Complaint:Follow-up (7wks ago had a heart cath) and Shortness of Breath    Problem List:    1. Nonischemic cardiomyopathy   1. 09/27/22, PANTERA Chahal MD revealing mildly dilated LV with EF 35 %, mild AR, trace to mjld MR.  2. 10/20/2022 [facility] no significant CAD EF 30%.  2. COVID 19- January 2022 - high flow O2  3. Hypothyroidism  4. BPH  5. Low Back Pain  6. Migraine Headaches  7. Platelet Aggregation.  8. Obstructive Sleep Apnea- dental device.  9. Chronic Anemia  10. Surgeries:  1. Achilles tendon repair  2. Cholecystectomy  3. Septoplasty  4. Tonsillectomy       No Known Allergies      Current Outpatient Medications:   •  carvedilol (COREG) 6.25 MG tablet, Take 1 tablet by mouth 2 (Two) Times a Day., Disp: 180 tablet, Rfl: 3  •  finasteride (PROSCAR) 5 MG tablet, Take 1 tablet by mouth Daily., Disp: 90 tablet, Rfl: 0  •  levothyroxine (SYNTHROID, LEVOTHROID) 175 MCG tablet, Take 175 mcg by mouth Daily., Disp: , Rfl:   •  Mirabegron ER (MYRBETRIQ) 50 MG tablet sustained-release 24 hour 24 hr tablet, Take 50 mg by mouth Daily., Disp: , Rfl:   •  sacubitril-valsartan (ENTRESTO) 24-26 MG tablet, Take 1 tablet by mouth 2 (Two) Times a Day., Disp: 180 tablet, Rfl: 1  •  SUMAtriptan (IMITREX) 100 MG tablet, As Needed., Disp: , Rfl:   •  tamsulosin (FLOMAX) 0.4 MG capsule 24 hr capsule, Take 1 capsule by mouth Daily., Disp: , Rfl:   •  topiramate (TOPAMAX) 50 MG tablet, 100 mg am 50 pm, Disp: , Rfl:         History of Present Illness    Patient returns today for hospital follow-up for newly diagnosed nonischemic cardiomyopathy.  He was started on Entresto and carvedilol and discharged home.  Since being discharged home, he has not been very active primarily because of 2 recent upper respiratory illnesses and possible influenza.  Denies any orthopnea PND does have a  "ongoing nonproductive cough however.  Denies edema presyncope or tachypalpitations    The following portions of the patient's history were reviewed and updated as appropriate: allergies, current medications, past family history, past medical history, past social history, past surgical history and problem list.    Family History   Problem Relation Age of Onset   • No Known Problems Mother    • Heart failure Father        Social History     Tobacco Use   • Smoking status: Never   • Smokeless tobacco: Never   Vaping Use   • Vaping Use: Never used   Substance Use Topics   • Alcohol use: Never   • Drug use: Never         ROS       Objective:   /68   Pulse 87   Ht 180.3 cm (71\")   Wt 96.2 kg (212 lb)   SpO2 98%   BMI 29.57 kg/m²         Vitals reviewed.   Constitutional:       Appearance: Well-developed and not in distress.   Neck:      Thyroid: No thyromegaly.      Vascular: No carotid bruit or JVD.   Pulmonary:      Breath sounds: Normal breath sounds.   Cardiovascular:      Regular rhythm.      No gallop. No S3 and S4 gallop.   Abdominal:      General: Bowel sounds are normal.      Palpations: Abdomen is soft. There is no abdominal mass.      Tenderness: There is no abdominal tenderness.   Musculoskeletal:         General: No deformity.      Extremities: No clubbing present.Skin:     General: Skin is warm and dry.      Findings: No rash.   Neurological:      Mental Status: Alert and oriented to person, place, and time.         Procedures          Assessment:   Assessment & Plan      Diagnoses and all orders for this visit:    1. Dilated cardiomyopathy (HCC) (Primary)    2. Primary hypertension      1.  Nonischemic cardiomyopathy, on Entresto and carvedilol.  Currently euvolemic.  2.  Hypertension well-controlled on the above medications  3.  Not on aspirin due to abnormal platelet function    Recommendations:  1.  Increase carvedilol to 12.5 mg twice daily  2.  Check echocardiogram in 1 month's time.  2.  " Follow-up after the above testing.       Wili Joshua MD      Dictated utilizing Dragon dictation

## 2023-01-17 ENCOUNTER — HOSPITAL ENCOUNTER (OUTPATIENT)
Dept: CARDIOLOGY | Facility: HOSPITAL | Age: 68
Discharge: HOME | End: 2023-01-17
Payer: MEDICARE

## 2023-01-17 VITALS — BODY MASS INDEX: 29.68 KG/M2 | WEIGHT: 212 LBS | HEIGHT: 71 IN

## 2023-01-17 DIAGNOSIS — I42.0 DILATED CARDIOMYOPATHY: ICD-10-CM

## 2023-01-17 DIAGNOSIS — I42.9 CARDIOMYOPATHY, UNSPECIFIED TYPE: ICD-10-CM

## 2023-01-17 DIAGNOSIS — R93.1 ABNORMAL ECHOCARDIOGRAM: ICD-10-CM

## 2023-01-17 LAB
BH CV ECHO MEAS - AI P1/2T: 666.3 MSEC
BH CV ECHO MEAS - AO MAX PG: 3.6 MMHG
BH CV ECHO MEAS - AO MEAN PG: 1.87 MMHG
BH CV ECHO MEAS - AO ROOT DIAM: 3.9 CM
BH CV ECHO MEAS - AO V2 MAX: 94.5 CM/SEC
BH CV ECHO MEAS - AO V2 VTI: 21.6 CM
BH CV ECHO MEAS - AVA(I,D): 2.7 CM2
BH CV ECHO MEAS - EDV(CUBED): 121.5 ML
BH CV ECHO MEAS - EDV(MOD-SP2): 167 ML
BH CV ECHO MEAS - EDV(MOD-SP4): 178 ML
BH CV ECHO MEAS - EF(MOD-BP): 61 %
BH CV ECHO MEAS - EF(MOD-SP2): 56.9 %
BH CV ECHO MEAS - EF(MOD-SP4): 64.6 %
BH CV ECHO MEAS - ESV(CUBED): 36.6 ML
BH CV ECHO MEAS - ESV(MOD-SP2): 72 ML
BH CV ECHO MEAS - ESV(MOD-SP4): 63 ML
BH CV ECHO MEAS - FS: 33 %
BH CV ECHO MEAS - IVS/LVPW: 1.04 CM
BH CV ECHO MEAS - IVSD: 0.91 CM
BH CV ECHO MEAS - LA DIMENSION: 4.1 CM
BH CV ECHO MEAS - LAT PEAK E' VEL: 9.7 CM/SEC
BH CV ECHO MEAS - LV DIASTOLIC VOL/BSA (35-75): 82.3 CM2
BH CV ECHO MEAS - LV MASS(C)D: 154.1 GRAMS
BH CV ECHO MEAS - LV MAX PG: 1.39 MMHG
BH CV ECHO MEAS - LV MEAN PG: 0.68 MMHG
BH CV ECHO MEAS - LV SYSTOLIC VOL/BSA (12-30): 29.1 CM2
BH CV ECHO MEAS - LV V1 MAX: 58.9 CM/SEC
BH CV ECHO MEAS - LV V1 VTI: 12.8 CM
BH CV ECHO MEAS - LVIDD: 5 CM
BH CV ECHO MEAS - LVIDS: 3.3 CM
BH CV ECHO MEAS - LVOT AREA: 4.6 CM2
BH CV ECHO MEAS - LVOT DIAM: 2.42 CM
BH CV ECHO MEAS - LVPWD: 0.87 CM
BH CV ECHO MEAS - MED PEAK E' VEL: 6.47 CM/SEC
BH CV ECHO MEAS - MV A MAX VEL: 47 CM/SEC
BH CV ECHO MEAS - MV DEC SLOPE: 308.4 CM/SEC2
BH CV ECHO MEAS - MV DEC TIME: 0.34 MSEC
BH CV ECHO MEAS - MV E MAX VEL: 56.9 CM/SEC
BH CV ECHO MEAS - MV E/A: 1.21
BH CV ECHO MEAS - MV P1/2T: 81.5 MSEC
BH CV ECHO MEAS - MVA(P1/2T): 2.7 CM2
BH CV ECHO MEAS - PA ACC SLOPE: 687.8 CM/SEC2
BH CV ECHO MEAS - PA ACC TIME: 0.08 SEC
BH CV ECHO MEAS - PA PR(ACCEL): 42.6 MMHG
BH CV ECHO MEAS - PA V2 MAX: 92.7 CM/SEC
BH CV ECHO MEAS - PAPD(PI EDV): 4 MMHG
BH CV ECHO MEAS - PI END-D VEL: 91.6 CM/SEC
BH CV ECHO MEAS - RAP SYSTOLE: 8 MMHG
BH CV ECHO MEAS - RVSP: 34 MMHG
BH CV ECHO MEAS - SI(MOD-SP2): 43.9 ML/M2
BH CV ECHO MEAS - SI(MOD-SP4): 53.2 ML/M2
BH CV ECHO MEAS - SV(LVOT): 58.6 ML
BH CV ECHO MEAS - SV(MOD-SP2): 95 ML
BH CV ECHO MEAS - SV(MOD-SP4): 115 ML
BH CV ECHO MEAS - TAPSE (>1.6): 2.4 CM
BH CV ECHO MEAS - TR MAX PG: 23.7 MMHG
BH CV ECHO MEAS - TR MAX VEL: 243.2 CM/SEC
BH CV ECHO MEASUREMENTS AVERAGE E/E' RATIO: 7.04
BH CV VAS BP LEFT ARM: NORMAL MMHG
BH CV XLRA - RV BASE: 5.4 CM
BH CV XLRA - RV LENGTH: 8.6 CM
BH CV XLRA - RV MID: 4.9 CM
BH CV XLRA - TDI S': 12.3 CM/SEC
IVRT: 95 MSEC
LEFT ATRIUM VOLUME INDEX: 36.6 ML/M2
LV EF 2D ECHO EST: 55 %
MAXIMAL PREDICTED HEART RATE: 151 BPM
STRESS TARGET HR: 128 BPM

## 2023-01-17 PROCEDURE — 93306 TTE W/DOPPLER COMPLETE: CPT | Performed by: INTERNAL MEDICINE

## 2023-01-17 PROCEDURE — 93306 TTE W/DOPPLER COMPLETE: CPT

## 2023-01-18 ENCOUNTER — APPOINTMENT (OUTPATIENT)
Dept: CARDIOLOGY | Facility: HOSPITAL | Age: 68
End: 2023-01-18

## 2023-01-19 ENCOUNTER — TELEPHONE (OUTPATIENT)
Dept: CARDIOLOGY | Facility: CLINIC | Age: 68
End: 2023-01-19
Payer: MEDICARE

## 2023-01-19 NOTE — TELEPHONE ENCOUNTER
Left VM advising of below    ----- Message from Wili Joshua MD sent at 1/18/2023  5:00 PM EST -----  LVEF has returned to normal.  Continue current medical therapy.

## 2023-02-13 RX ORDER — CARVEDILOL 12.5 MG/1
12.5 TABLET ORAL 2 TIMES DAILY
Qty: 180 TABLET | Refills: 3 | Status: SHIPPED | OUTPATIENT
Start: 2023-02-13

## 2023-02-15 ENCOUNTER — OFFICE VISIT (OUTPATIENT)
Dept: CARDIOLOGY | Facility: CLINIC | Age: 68
End: 2023-02-15
Payer: MEDICARE

## 2023-02-15 VITALS
OXYGEN SATURATION: 97 % | SYSTOLIC BLOOD PRESSURE: 120 MMHG | WEIGHT: 217 LBS | DIASTOLIC BLOOD PRESSURE: 70 MMHG | HEIGHT: 71 IN | HEART RATE: 67 BPM | BODY MASS INDEX: 30.38 KG/M2

## 2023-02-15 DIAGNOSIS — I42.0 DILATED CARDIOMYOPATHY: Primary | ICD-10-CM

## 2023-02-15 DIAGNOSIS — I10 PRIMARY HYPERTENSION: ICD-10-CM

## 2023-02-15 PROCEDURE — 99213 OFFICE O/P EST LOW 20 MIN: CPT | Performed by: INTERNAL MEDICINE

## 2023-02-15 NOTE — PROGRESS NOTES
Subjective:     Encounter Date:02/15/2023    Primary Care Physician: Kira Daugherty APRN      Patient ID: Ivan Serrano is a 67 y.o. male.    Chief Complaint:Follow-up (Refills meds)    Problem List:     1. Nonischemic cardiomyopathy   1. 09/27/22, PANTERA Chahal MD revealing mildly dilated LV with EF 35 %, mild AR, trace to mjld MR.  2. 10/20/2022 [facility] no significant CAD EF 30%.  3. EF 55%  2. COVID 19- January 2022 - high flow O2  3. Hypothyroidism  4. BPH  5. Low Back Pain  6. Migraine Headaches  7. Platelet Aggregation.  8. Obstructive Sleep Apnea- dental device.  9. Chronic Anemia  10. Surgeries:  1. Achilles tendon repair  2. Cholecystectomy  3. Septoplasty  4. Tonsillectomy       No Known Allergies      Current Outpatient Medications:   •  carvedilol (COREG) 12.5 MG tablet, Take 1 tablet by mouth 2 (Two) Times a Day., Disp: 180 tablet, Rfl: 3  •  finasteride (PROSCAR) 5 MG tablet, Take 1 tablet by mouth Daily., Disp: 90 tablet, Rfl: 0  •  levothyroxine (SYNTHROID, LEVOTHROID) 175 MCG tablet, Take 175 mcg by mouth Daily., Disp: , Rfl:   •  Mirabegron ER (MYRBETRIQ) 50 MG tablet sustained-release 24 hour 24 hr tablet, Take 50 mg by mouth Daily., Disp: , Rfl:   •  sacubitril-valsartan (ENTRESTO) 24-26 MG tablet, Take 1 tablet by mouth 2 (Two) Times a Day., Disp: 180 tablet, Rfl: 1  •  SUMAtriptan (IMITREX) 100 MG tablet, As Needed., Disp: , Rfl:   •  tamsulosin (FLOMAX) 0.4 MG capsule 24 hr capsule, Take 1 capsule by mouth Daily., Disp: , Rfl:   •  topiramate (TOPAMAX) 50 MG tablet, 100 mg am 50 pm, Disp: , Rfl:         History of Present Illness    Patient returns today for follow-up of his nonischemic cardiomyopathy.  Since her last visit, he is doing well without any chest pain exertional dyspnea orthopnea PND or tachypalpitations.  Since her last visit he is increased his exercise capacity, and he has been exercising 45 minutes to 1 hour daily without symptoms.    The following portions of the  "patient's history were reviewed and updated as appropriate: allergies, current medications, past family history, past medical history, past social history, past surgical history and problem list.      Social History     Tobacco Use   • Smoking status: Never   • Smokeless tobacco: Never   Vaping Use   • Vaping Use: Never used   Substance Use Topics   • Alcohol use: Never   • Drug use: Never         ROS       Objective:   /70 (BP Location: Left arm, Patient Position: Sitting)   Pulse 67   Ht 180.3 cm (71\")   Wt 98.4 kg (217 lb)   SpO2 97%   BMI 30.27 kg/m²         Vitals reviewed.   Constitutional:       Appearance: Well-developed and not in distress.   Neck:      Thyroid: No thyromegaly.      Vascular: No carotid bruit or JVD.   Pulmonary:      Breath sounds: Normal breath sounds.   Cardiovascular:      Regular rhythm.      No gallop. No S3 and S4 gallop.   Abdominal:      General: Bowel sounds are normal.      Palpations: Abdomen is soft. There is no abdominal mass.      Tenderness: There is no abdominal tenderness.   Musculoskeletal:         General: No deformity.      Extremities: No clubbing present.Skin:     General: Skin is warm and dry.      Findings: No rash.   Neurological:      Mental Status: Alert and oriented to person, place, and time.         Procedures          Assessment:   Assessment & Plan      Diagnoses and all orders for this visit:    1. Dilated cardiomyopathy (HCC) (Primary)    2. Primary hypertension      1.  Nonischemic cardiomyopathy, LVEF is now normalized to 55%.  Currently euvolemic and functional class I.  2.  Hypertension controlled    Recommendations:  1.  Patient is stable, doing well.  Continue current medical therapy.  2.  Discussed the need for continued medical therapy and the possibility of decreasing LVEF if we discontinue his medications.  Therefore we will continue.  3.  Revisit annually.       Wili Joshua MD          Dictated utilizing Dragon dictation  "

## 2023-02-16 ENCOUNTER — TELEPHONE (OUTPATIENT)
Dept: UROLOGY | Facility: CLINIC | Age: 68
End: 2023-02-16
Payer: MEDICARE

## 2023-02-16 NOTE — TELEPHONE ENCOUNTER
Patient requesting refill for finasteride (PROSCAR) 5 MG tablet, please send to [facility] pharmacy.    Dr. Koch, please advise. Thank you.

## 2023-02-17 DIAGNOSIS — R33.9 INCOMPLETE BLADDER EMPTYING: ICD-10-CM

## 2023-02-17 DIAGNOSIS — N40.0 BENIGN PROSTATIC HYPERPLASIA, UNSPECIFIED WHETHER LOWER URINARY TRACT SYMPTOMS PRESENT: ICD-10-CM

## 2023-02-17 DIAGNOSIS — N32.81 OAB (OVERACTIVE BLADDER): ICD-10-CM

## 2023-02-17 RX ORDER — FINASTERIDE 5 MG/1
5 TABLET, FILM COATED ORAL DAILY
Qty: 90 TABLET | Refills: 3 | Status: SHIPPED | OUTPATIENT
Start: 2023-02-17 | End: 2024-02-12

## 2023-02-27 ENCOUNTER — OFFICE VISIT (OUTPATIENT)
Dept: UROLOGY | Facility: CLINIC | Age: 68
End: 2023-02-27
Payer: MEDICARE

## 2023-02-27 VITALS — OXYGEN SATURATION: 97 % | BODY MASS INDEX: 30.38 KG/M2 | HEART RATE: 67 BPM | WEIGHT: 217 LBS | HEIGHT: 71 IN

## 2023-02-27 DIAGNOSIS — R97.20 ELEVATED PROSTATE SPECIFIC ANTIGEN (PSA): Primary | ICD-10-CM

## 2023-02-27 PROCEDURE — 51798 US URINE CAPACITY MEASURE: CPT | Performed by: UROLOGY

## 2023-02-27 PROCEDURE — 99214 OFFICE O/P EST MOD 30 MIN: CPT | Performed by: UROLOGY

## 2023-02-27 NOTE — PROGRESS NOTES
Follow Up Office Visit      Patient Name: Ivan Serrano  : 1955   MRN: [id number]     Chief Complaint:    Chief Complaint   Patient presents with   • Elevated PSA       Referring Provider: No ref. provider found    History of Present Illness: Ivan Serrano is a 67 y.o. male who presents today for follow up of frequency and urgency.  He has undergone Urolift previously and was doing well.  He had what sounded like a possible pull through.  He states his frequency and urgency has improved.  He was able to make it from [place] to here without issue.  He still has occasional urge incontinence.  He is on tamsulosin, finasteride, and myrbetriq currently.  We had previously discussed TURP but he wanted to hold off.  He has a bleeding disorder and needs Amicar prior to any procedures.   He reports that his cardiac work up is complete and his EF is now stable.  He had a severe decrease in EF after COVID.  He reports his EF is back up to 55%.    He had a rise in his PSA on finasteride.  His PSA is now 3.9    He had an MRI 2 years ago which showed a PIRADS 2 lesion.  He has also had a negative biopsy in approximately 2017 or 2018.   He believes he has had 2 negative biopsies in the past.        Subjective      Review of System:   Review of Systems   Constitutional: Negative for chills, fatigue, fever and unexpected weight change.   HENT: Negative for congestion, rhinorrhea and sore throat.    Eyes: Negative for visual disturbance.   Respiratory: Negative for apnea, cough, chest tightness and shortness of breath.    Cardiovascular: Negative for chest pain.   Gastrointestinal: Negative for abdominal pain, constipation, diarrhea, nausea and vomiting.   Endocrine: Negative for polydipsia and polyuria.   Genitourinary: Negative for decreased urine volume, difficulty urinating, dysuria, enuresis, flank pain, frequency, genital sores, hematuria and urgency.   Musculoskeletal: Negative for gait problem.    Skin: Negative for rash and wound.   Allergic/Immunologic: Negative for immunocompromised state.   Neurological: Negative for dizziness, tremors, syncope, weakness and light-headedness.   Hematological: Does not bruise/bleed easily.      I have reviewed the ROS documented by my clinical staff, I have updated appropriately and I agree. Susanne Koch MD    I have reviewed and the following portions of the patient's history were updated as appropriate: past family history, past medical history, past social history, past surgical history and problem list.    Past Medical History:   Past Medical History:   Diagnosis Date   • Anemia    • BPH (benign prostatic hyperplasia)    • Chronic prostatitis    • Clotting disorder (HCC)    • Coronary artery disease    • Elevated PSA    • Erectile dysfunction    • GERD (gastroesophageal reflux disease)    • Hypothyroidism    • Platelet disorder (HCC)    • Urgency of urination    • Urinary incontinence    • Urinary tract infection        Past Surgical History:  Past Surgical History:   Procedure Laterality Date   • ACHILLES TENDON REPAIR     • APPENDECTOMY     • CARDIAC CATHETERIZATION Left 10/20/2022    Procedure: Left Heart Cath;  Surgeon: Wili Joshua MD;  Location: [facility] INVASIVE LOCATION;  Service: Cardiovascular;  Laterality: Left;   • CHOLECYSTECTOMY     • CYSTOSCOPY     • SEPTOPLASTY     • TONSILLECTOMY         Family History:   family history includes Heart failure in his father; No Known Problems in his mother.   Otherwise pertinent FHx was reviewed and not pertinent to current issue.    Social History:    reports that he has never smoked. He has never been exposed to tobacco smoke. He has never used smokeless tobacco. He reports that he does not drink alcohol and does not use drugs.    Medications:     Current Outpatient Medications:   •  carvedilol (COREG) 12.5 MG tablet, Take 1 tablet by mouth 2 (Two) Times a Day., Disp: 180 tablet, Rfl: 3  •  finasteride  (PROSCAR) 5 MG tablet, Take 1 tablet by mouth Daily for 360 days., Disp: 90 tablet, Rfl: 3  •  levothyroxine (SYNTHROID, LEVOTHROID) 175 MCG tablet, Take 175 mcg by mouth Daily., Disp: , Rfl:   •  Mirabegron ER (MYRBETRIQ) 50 MG tablet sustained-release 24 hour 24 hr tablet, Take 50 mg by mouth Daily., Disp: , Rfl:   •  sacubitril-valsartan (ENTRESTO) 24-26 MG tablet, Take 1 tablet by mouth 2 (Two) Times a Day., Disp: 180 tablet, Rfl: 3  •  SUMAtriptan (IMITREX) 100 MG tablet, As Needed., Disp: , Rfl:   •  tamsulosin (FLOMAX) 0.4 MG capsule 24 hr capsule, Take 1 capsule by mouth Daily., Disp: , Rfl:   •  topiramate (TOPAMAX) 50 MG tablet, 100 mg am 50 pm, Disp: , Rfl:     Allergies:   No Known Allergies    IPSS Questionnaire (AUA-7):  Over the past month…    1)  Incomplete Emptying  How often have you had a sensation of not emptying your bladder?  1 - Less than 1 time in 5   2)  Frequency  How often have you had to urinate less than every two hours? 4 - More than half the time   3)  Intermittency  How often have you found you stopped and started again several times when you urinated?  2 - Less than half the time   4) Urgency  How often have you found it difficult to postpone urination?  3 - About half the time   5) Weak Stream  How often have you had a weak urinary stream?  3 - About half the time   6) Straining  How often have you had to push or strain to begin urination?  2 - Less than half the time   7) Nocturia  How many times did you typically get up at night to urinate?  1 - 1 time   Total Score:  16   The International Prostate Symptom Score (IPSS) is used to screen, diagnose, track symptoms of benign prostatic hyperplasia (BPH).    0-7 pts (Mild Symptoms)  / 8-19 pts (Moderate) / 20-35 (Severe)    Quality of life due to urinary symptoms:  If you were to spend the rest of your life with your urinary condition the way it is now, how would you feel about that? 3-Mixed   Urine Leakage (Incontinence) 0-No Leakage  "      Post void residual bladder scan:   10 mL     Objective     Physical Exam:   Vital Signs:   Vitals:    02/27/23 1448   Pulse: 67   SpO2: 97%   Weight: 98.4 kg (217 lb)   Height: 180.3 cm (70.98\")   PainSc: 0-No pain     Body mass index is 30.28 kg/m².     Physical Exam  Vitals and nursing note reviewed.   Constitutional:       General: He is awake. He is not in acute distress.     Appearance: Normal appearance. He is well-developed.   HENT:      Head: Normocephalic and atraumatic.      Right Ear: External ear normal.      Left Ear: External ear normal.      Nose: Nose normal.   Eyes:      Conjunctiva/sclera: Conjunctivae normal.   Pulmonary:      Effort: Pulmonary effort is normal.   Abdominal:      General: There is no distension.      Palpations: Abdomen is soft. There is no mass.      Tenderness: There is no abdominal tenderness. There is no right CVA tenderness, left CVA tenderness, guarding or rebound.      Hernia: No hernia is present. There is no hernia in the left inguinal area or right inguinal area.   Genitourinary:     Pubic Area: No rash.       Rectum: No mass or tenderness. Normal anal tone.   Musculoskeletal:      Cervical back: Normal range of motion.   Lymphadenopathy:      Lower Body: No right inguinal adenopathy. No left inguinal adenopathy.   Skin:     General: Skin is warm.   Neurological:      General: No focal deficit present.      Mental Status: He is alert and oriented to person, place, and time.   Psychiatric:         Behavior: Behavior normal. Behavior is cooperative.         Labs:   Brief Urine Lab Results  (Last result in the past 365 days)      Color   Clarity   Blood   Leuk Est   Nitrite   Protein   CREAT   Urine HCG        10/27/22 1444 Yellow   Clear   Negative   Negative   Negative   Negative                      Lab Results   Component Value Date    GLUCOSE 115 (H) 10/20/2022    CALCIUM 9.2 10/20/2022     10/20/2022    K 3.9 10/20/2022    CO2 24.0 10/20/2022     (H) " 10/20/2022    BUN 16 10/20/2022    CREATININE 1.00 10/20/2022    EGFRIFAFRI >60 01/13/2022    EGFRIFNONA >60 01/13/2022    BCR 16.0 10/20/2022    ANIONGAP 8.0 10/20/2022       Lab Results   Component Value Date    WBC 7.32 10/20/2022    HGB 13.0 10/20/2022    HCT 38.3 10/20/2022    MCV 88.2 10/20/2022     10/20/2022       Lab Results   Component Value Date    PSA 3.930 10/27/2022    PSA 1.79 09/21/2021       Images:   No Images in the past 120 days found..    Measures:   Tobacco:   Ivan Serrano  reports that he has never smoked. He has never been exposed to tobacco smoke. He has never used smokeless tobacco.    Urine Incontinence: Patient reports that he is not currently experiencing any symptoms of urinary incontinence.         Assessment / Plan      Assessment/Plan:   67 y.o. male who presented today for follow up of his LUTS.  He had a PSA approximately 4 months ago which has risen.  He has had an MRI in 2021 which showed only a PIRADS 2 lesion.  He has also had two prostate biopsies in the past which were negative.  I will plan on repeating his PSA in 3 months.  At this time he would like to maintain on his medication.   He is interested in [employer] for his irritative symptoms.  I will have him discuss this with Antonoi as well.  In the mean time I will plan on repeating his PSA prior to any procedure.  Of note, he will need Amicar prior to any procedures as well.    Diagnoses and all orders for this visit:    1. Elevated prostate specific antigen (PSA) (Primary)  -     PSA DIAGNOSTIC; Future         Follow Up:   Return in about 3 months (around 5/27/2023).    I spent approximately 30 minutes providing clinical care for this patient; including review of patient's chart and provider documentation, face to face time spent with patient in examination room (obtaining history, performing physical exam, discussing diagnosis and management options), placing orders, and completing patient documentation.      Susanne Koch MD  [facility] [place]

## 2023-03-09 DIAGNOSIS — N32.81 OAB (OVERACTIVE BLADDER): Primary | ICD-10-CM

## 2023-03-29 ENCOUNTER — LAB (OUTPATIENT)
Dept: LAB | Facility: HOSPITAL | Age: 68
End: 2023-03-29
Payer: MEDICARE

## 2023-03-29 DIAGNOSIS — R97.20 ELEVATED PROSTATE SPECIFIC ANTIGEN (PSA): ICD-10-CM

## 2023-03-29 LAB — PSA SERPL-MCNC: 3.1 NG/ML (ref 0–4)

## 2023-03-29 PROCEDURE — 36415 COLL VENOUS BLD VENIPUNCTURE: CPT

## 2023-03-29 PROCEDURE — 84153 ASSAY OF PSA TOTAL: CPT

## 2023-05-22 ENCOUNTER — OFFICE VISIT (OUTPATIENT)
Dept: UROLOGY | Facility: CLINIC | Age: 68
End: 2023-05-22
Payer: MEDICARE

## 2023-05-22 VITALS — SYSTOLIC BLOOD PRESSURE: 112 MMHG | DIASTOLIC BLOOD PRESSURE: 62 MMHG

## 2023-05-22 DIAGNOSIS — N32.81 OAB (OVERACTIVE BLADDER): Primary | ICD-10-CM

## 2023-05-22 DIAGNOSIS — N40.1 BENIGN LOCALIZED PROSTATIC HYPERPLASIA WITH LOWER URINARY TRACT SYMPTOMS (LUTS): ICD-10-CM

## 2023-05-22 PROCEDURE — 1159F MED LIST DOCD IN RCRD: CPT | Performed by: UROLOGY

## 2023-05-22 PROCEDURE — 99214 OFFICE O/P EST MOD 30 MIN: CPT | Performed by: UROLOGY

## 2023-05-22 PROCEDURE — 81003 URINALYSIS AUTO W/O SCOPE: CPT | Performed by: UROLOGY

## 2023-05-22 PROCEDURE — 1160F RVW MEDS BY RX/DR IN RCRD: CPT | Performed by: UROLOGY

## 2023-05-22 RX ORDER — TAMSULOSIN HYDROCHLORIDE 0.4 MG/1
1 CAPSULE ORAL DAILY
Qty: 90 CAPSULE | Refills: 3 | Status: SHIPPED | OUTPATIENT
Start: 2023-05-22 | End: 2024-05-16

## 2023-05-22 NOTE — PROGRESS NOTES
Follow Up Office Visit      Patient Name: Ivan Serrano  : 1955   MRN: [id number]     Chief Complaint:    Chief Complaint   Patient presents with   • Elevated PSA       Referring Provider: No ref. provider found    History of Present Illness: Ivan Serrano is a 68 y.o. male who presents today for follow up of an elevated PSA.  He reports he has done well since his last visit.  He is scheduled for [employer] on Wednesday.  He had have platelet dysfunction and he was advised to take Amicar for any bleeding risk.   He sees  in [place].      Subjective      Review of System:   Review of Systems   Constitutional: Negative.    HENT: Negative.    Eyes: Negative.    Respiratory: Negative.    Cardiovascular: Positive for leg swelling.   Gastrointestinal: Negative.    Endocrine: Negative.    Genitourinary: Positive for difficulty urinating, frequency and urgency.   Musculoskeletal: Negative.    Skin: Negative.    Allergic/Immunologic: Negative.    Neurological: Negative.    Hematological: Negative.    Psychiatric/Behavioral: Negative.       I have reviewed the ROS documented by my clinical staff, I have updated appropriately and I agree. Susanne Koch MD    I have reviewed and the following portions of the patient's history were updated as appropriate: past family history, past medical history, past social history, past surgical history and problem list.    Past Medical History:   Past Medical History:   Diagnosis Date   • Anemia    • BPH (benign prostatic hyperplasia)    • Chronic prostatitis    • Clotting disorder    • Coronary artery disease    • Elevated PSA    • Erectile dysfunction    • GERD (gastroesophageal reflux disease)    • Hypothyroidism    • Platelet disorder    • Urgency of urination    • Urinary incontinence    • Urinary tract infection        Past Surgical History:  Past Surgical History:   Procedure Laterality Date   • ACHILLES TENDON REPAIR     • APPENDECTOMY     • CARDIAC  CATHETERIZATION Left 10/20/2022    Procedure: Left Heart Cath;  Surgeon: Wili Joshua MD;  Location: [facility] CATH INVASIVE LOCATION;  Service: Cardiovascular;  Laterality: Left;   • CHOLECYSTECTOMY     • CYSTOSCOPY     • SEPTOPLASTY     • TONSILLECTOMY         Family History:   family history includes Heart failure in his father; No Known Problems in his mother.   Otherwise pertinent FHx was reviewed and not pertinent to current issue.    Social History:    reports that he has never smoked. He has never been exposed to tobacco smoke. He has never used smokeless tobacco. He reports that he does not drink alcohol and does not use drugs.    Medications:     Current Outpatient Medications:   •  carvedilol (COREG) 12.5 MG tablet, Take 1 tablet by mouth 2 (Two) Times a Day., Disp: 180 tablet, Rfl: 3  •  finasteride (PROSCAR) 5 MG tablet, Take 1 tablet by mouth Daily for 360 days., Disp: 90 tablet, Rfl: 3  •  levothyroxine (SYNTHROID, LEVOTHROID) 175 MCG tablet, Take 1 tablet by mouth Daily., Disp: , Rfl:   •  Mirabegron ER (MYRBETRIQ) 50 MG tablet sustained-release 24 hour 24 hr tablet, Take 50 mg by mouth Daily., Disp: , Rfl:   •  sacubitril-valsartan (ENTRESTO) 24-26 MG tablet, Take 1 tablet by mouth 2 (Two) Times a Day., Disp: 180 tablet, Rfl: 3  •  SUMAtriptan (IMITREX) 100 MG tablet, As Needed., Disp: , Rfl:   •  tamsulosin (FLOMAX) 0.4 MG capsule 24 hr capsule, Take 1 capsule by mouth Daily for 360 days., Disp: 90 capsule, Rfl: 3  •  topiramate (TOPAMAX) 50 MG tablet, 100 mg am 50 pm, Disp: , Rfl:     Allergies:   No Known Allergies    IPSS Questionnaire (AUA-7):  Over the past month…    1)  Incomplete Emptying  How often have you had a sensation of not emptying your bladder?  1 - Less than 1 time in 5   2)  Frequency  How often have you had to urinate less than every two hours? 4 - More than half the time   3)  Intermittency  How often have you found you stopped and started again several times when you urinated?   3 - About half the time   4) Urgency  How often have you found it difficult to postpone urination?  4 - More than half the time   5) Weak Stream  How often have you had a weak urinary stream?  3 - About half the time   6) Straining  How often have you had to push or strain to begin urination?  2 - Less than half the time   7) Nocturia  How many times did you typically get up at night to urinate?  1 - 1 time   Total Score:  21   The International Prostate Symptom Score (IPSS) is used to screen, diagnose, track symptoms of benign prostatic hyperplasia (BPH).    0-7 pts (Mild Symptoms)  / 8-19 pts (Moderate) / 20-35 (Severe)    Quality of life due to urinary symptoms:  If you were to spend the rest of your life with your urinary condition the way it is now, how would you feel about that? 3-Mixed   Urine Leakage (Incontinence) 1-Mild (A few drops a day, no pad use)         Objective     Physical Exam:   Vital Signs:   Vitals:    05/22/23 1326   BP: 112/62     There is no height or weight on file to calculate BMI.     Physical Exam  Vitals and nursing note reviewed.   Constitutional:       General: He is awake. He is not in acute distress.     Appearance: Normal appearance. He is well-developed.   HENT:      Head: Normocephalic and atraumatic.      Right Ear: External ear normal.      Left Ear: External ear normal.      Nose: Nose normal.   Eyes:      Conjunctiva/sclera: Conjunctivae normal.   Pulmonary:      Effort: Pulmonary effort is normal.   Abdominal:      General: There is no distension.      Palpations: Abdomen is soft. There is no mass.      Tenderness: There is no abdominal tenderness. There is no right CVA tenderness, left CVA tenderness, guarding or rebound.      Hernia: No hernia is present. There is no hernia in the left inguinal area or right inguinal area.   Genitourinary:     Pubic Area: No rash.       Rectum: No mass or tenderness. Normal anal tone.   Musculoskeletal:      Cervical back: Normal range  of motion.   Lymphadenopathy:      Lower Body: No right inguinal adenopathy. No left inguinal adenopathy.   Skin:     General: Skin is warm.   Neurological:      General: No focal deficit present.      Mental Status: He is alert and oriented to person, place, and time.   Psychiatric:         Behavior: Behavior normal. Behavior is cooperative.         Labs:   Brief Urine Lab Results  (Last result in the past 365 days)      Color   Clarity   Blood   Leuk Est   Nitrite   Protein   CREAT   Urine HCG        10/27/22 1444 Yellow   Clear   Negative   Negative   Negative   Negative                      Lab Results   Component Value Date    GLUCOSE 115 (H) 10/20/2022    CALCIUM 9.2 10/20/2022     10/20/2022    K 3.9 10/20/2022    CO2 24.0 10/20/2022     (H) 10/20/2022    BUN 16 10/20/2022    CREATININE 1.00 10/20/2022    EGFRIFAFRI >60 01/13/2022    EGFRIFNONA >60 01/13/2022    BCR 16.0 10/20/2022    ANIONGAP 8.0 10/20/2022       Lab Results   Component Value Date    WBC 7.32 10/20/2022    HGB 13.0 10/20/2022    HCT 38.3 10/20/2022    MCV 88.2 10/20/2022     10/20/2022       Lab Results   Component Value Date    PSA 3.100 03/29/2023    PSA 3.930 10/27/2022    PSA 1.79 09/21/2021       Images:   No Images in the past 120 days found..    Measures:   Tobacco:   Ivan Serrano  reports that he has never smoked. He has never been exposed to tobacco smoke. He has never used smokeless tobacco..      Urine Incontinence: Patient reports that he is not currently experiencing any symptoms of urinary incontinence.         Assessment / Plan      Assessment/Plan:   68 y.o. male who presented today for follow up of urinary urgency.  We performed urolift without anyAamicar and I do not forsee this being a bleeding issue either.  I will have him follow up on Wednesday for PNE.  As far as is PSA is concerned, I will repeat that in 1 year.  If it rises above 4 we will plan on MRI and possible fusion biopsy.      I spoke  with  and he will not need any Amicar.      Diagnoses and all orders for this visit:    1. OAB (overactive bladder) (Primary)    2. Benign localized prostatic hyperplasia with lower urinary tract symptoms (LUTS)  -     tamsulosin (FLOMAX) 0.4 MG capsule 24 hr capsule; Take 1 capsule by mouth Daily for 360 days.  Dispense: 90 capsule; Refill: 3         Follow Up:   Return for Follow up after procedure.    I spent approximately 30 minutes providing clinical care for this patient; including review of patient's chart and provider documentation, face to face time spent with patient in examination room (obtaining history, performing physical exam, discussing diagnosis and management options), placing orders, and completing patient documentation.     Susanne Koch MD  [facility] [place]

## 2023-05-24 ENCOUNTER — TELEPHONE (OUTPATIENT)
Dept: CARDIOLOGY | Facility: CLINIC | Age: 68
End: 2023-05-24
Payer: MEDICARE

## 2023-05-24 DIAGNOSIS — N32.81 OAB (OVERACTIVE BLADDER): Primary | ICD-10-CM

## 2023-05-26 ENCOUNTER — TELEPHONE (OUTPATIENT)
Dept: UROLOGY | Facility: CLINIC | Age: 68
End: 2023-05-26
Payer: MEDICARE

## 2023-05-26 NOTE — TELEPHONE ENCOUNTER
Patient called and stated that his tamsulosin is usually a different dose, and wonders if what was sent was the correct dose and instructions?

## 2023-06-01 DIAGNOSIS — N40.1 BENIGN LOCALIZED PROSTATIC HYPERPLASIA WITH LOWER URINARY TRACT SYMPTOMS (LUTS): ICD-10-CM

## 2023-06-01 RX ORDER — TAMSULOSIN HYDROCHLORIDE 0.4 MG/1
2 CAPSULE ORAL NIGHTLY
Qty: 90 CAPSULE | Refills: 3 | Status: SHIPPED | OUTPATIENT
Start: 2023-06-01 | End: 2024-05-26

## 2023-06-05 ENCOUNTER — TELEPHONE (OUTPATIENT)
Dept: UROLOGY | Facility: CLINIC | Age: 68
End: 2023-06-05
Payer: MEDICARE

## 2023-06-05 NOTE — TELEPHONE ENCOUNTER
Patient left a message, states Nabor call patient regarding medication - Tamsulosin (FLOMAX) 0.4 MG.  Patient states he takes 2 tables in the evening.

## 2023-06-15 ENCOUNTER — PRE-ADMISSION TESTING (OUTPATIENT)
Dept: PREADMISSION TESTING | Facility: HOSPITAL | Age: 68
End: 2023-06-15
Payer: MEDICARE

## 2023-06-15 VITALS — HEIGHT: 71 IN | BODY MASS INDEX: 30.34 KG/M2 | WEIGHT: 216.71 LBS

## 2023-06-15 DIAGNOSIS — N32.81 OAB (OVERACTIVE BLADDER): ICD-10-CM

## 2023-06-15 DIAGNOSIS — N40.1 BENIGN LOCALIZED PROSTATIC HYPERPLASIA WITH LOWER URINARY TRACT SYMPTOMS (LUTS): Primary | ICD-10-CM

## 2023-06-15 DIAGNOSIS — N40.1 BENIGN LOCALIZED PROSTATIC HYPERPLASIA WITH LOWER URINARY TRACT SYMPTOMS (LUTS): ICD-10-CM

## 2023-06-15 LAB
ANION GAP SERPL CALCULATED.3IONS-SCNC: 8 MMOL/L (ref 5–15)
APTT PPP: 33.4 SECONDS (ref 22–39)
BUN SERPL-MCNC: 21 MG/DL (ref 8–23)
BUN/CREAT SERPL: 21.6 (ref 7–25)
CALCIUM SPEC-SCNC: 9.3 MG/DL (ref 8.6–10.5)
CHLORIDE SERPL-SCNC: 111 MMOL/L (ref 98–107)
CO2 SERPL-SCNC: 21 MMOL/L (ref 22–29)
CREAT SERPL-MCNC: 0.97 MG/DL (ref 0.76–1.27)
DEPRECATED RDW RBC AUTO: 42 FL (ref 37–54)
EGFRCR SERPLBLD CKD-EPI 2021: 84 ML/MIN/1.73
ERYTHROCYTE [DISTWIDTH] IN BLOOD BY AUTOMATED COUNT: 12.7 % (ref 12.3–15.4)
GLUCOSE SERPL-MCNC: 102 MG/DL (ref 65–99)
HBA1C MFR BLD: 5.9 % (ref 4.8–5.6)
HCT VFR BLD AUTO: 37 % (ref 37.5–51)
HGB BLD-MCNC: 12.1 G/DL (ref 13–17.7)
MCH RBC QN AUTO: 29.4 PG (ref 26.6–33)
MCHC RBC AUTO-ENTMCNC: 32.7 G/DL (ref 31.5–35.7)
MCV RBC AUTO: 90 FL (ref 79–97)
PLATELET # BLD AUTO: 166 10*3/MM3 (ref 140–450)
PMV BLD AUTO: 10.2 FL (ref 6–12)
POTASSIUM SERPL-SCNC: 4.4 MMOL/L (ref 3.5–5.2)
POTASSIUM SERPL-SCNC: 4.4 MMOL/L (ref 3.5–5.2)
QT INTERVAL: 398 MS
QTC INTERVAL: 380 MS
RBC # BLD AUTO: 4.11 10*6/MM3 (ref 4.14–5.8)
SODIUM SERPL-SCNC: 140 MMOL/L (ref 136–145)
WBC NRBC COR # BLD: 5.38 10*3/MM3 (ref 3.4–10.8)

## 2023-06-15 PROCEDURE — 84132 ASSAY OF SERUM POTASSIUM: CPT

## 2023-06-15 PROCEDURE — 93005 ELECTROCARDIOGRAM TRACING: CPT

## 2023-06-15 PROCEDURE — 36415 COLL VENOUS BLD VENIPUNCTURE: CPT

## 2023-06-15 PROCEDURE — 85027 COMPLETE CBC AUTOMATED: CPT

## 2023-06-15 PROCEDURE — 85730 THROMBOPLASTIN TIME PARTIAL: CPT

## 2023-06-15 PROCEDURE — 80048 BASIC METABOLIC PNL TOTAL CA: CPT

## 2023-06-15 PROCEDURE — 83036 HEMOGLOBIN GLYCOSYLATED A1C: CPT

## 2023-06-15 NOTE — PAT
An arrival time for procedure was not provided during PAT visit. If patient had any questions or concerns about their arrival time, they were instructed to contact their surgeon/physician.  Additionally, if the patient referred to an arrival time that was acquired from their my chart account, patient was encouraged to verify that time with their surgeon/physician. Arrival times are NOT provided in Pre Admission Testing Department.    Patient viewed general PAT education video as instructed in their preoperative information received from their surgeon.  Patient stated the general PAT education video was viewed in its entirety and survey completed.  Copies of PAT general education handouts (Incentive Spirometry, Meds to Beds Program, Patient Belongings, Pre-op skin preparation instructions, Blood Glucose testing, Visitor policy, Surgery FAQ, Code H) distributed to patient if not printed. Education related to the PAT pass and skin preparation for surgery (if applicable) completed in PAT as a reinforcement to PAT education video. Patient instructed to return PAT pass provided today as well as completed skin preparation sheet (if applicable) on the day of procedure.     Additionally if patient had not viewed video yet but intended to view it at home or in our waiting area, then referred them to the handout with QR code/link provided during PAT visit.  Instructed patient to complete survey after viewing the video in its entirety.  Encouraged patient/family to read PAT general education handouts thoroughly and notify PAT staff with any questions or concerns. Patient verbalized understanding of all information and priority content.    Per Anesthesia Request, patient instructed not to take their ACE/ARB medications on the AM of surgery.    Patient denies any current skin issues.     Patient to apply Chlorhexadine wipes  to surgical area (as instructed) the night before procedure and the AM of procedure. Wipes  provided.    Verified patient previously completed cardiology visit for cardiac risk assessment in preparation for upcoming procedure, EKG obtained.      Patient late for PAT appointment. Labs, EKG, consent, and teaching done. Database not completed.

## 2023-08-02 ENCOUNTER — PROCEDURE VISIT (OUTPATIENT)
Dept: UROLOGY | Facility: CLINIC | Age: 68
End: 2023-08-02
Payer: MEDICARE

## 2023-08-02 VITALS — HEIGHT: 71 IN | BODY MASS INDEX: 30.24 KG/M2 | WEIGHT: 216 LBS

## 2023-08-02 DIAGNOSIS — R31.1 BENIGN ESSENTIAL MICROSCOPIC HEMATURIA: ICD-10-CM

## 2023-08-02 DIAGNOSIS — N40.1 BENIGN LOCALIZED PROSTATIC HYPERPLASIA WITH LOWER URINARY TRACT SYMPTOMS (LUTS): Primary | ICD-10-CM

## 2023-08-02 PROBLEM — N32.0 BLADDER OUTLET OBSTRUCTION: Status: ACTIVE | Noted: 2023-08-02

## 2023-08-02 PROCEDURE — 87086 URINE CULTURE/COLONY COUNT: CPT | Performed by: UROLOGY

## 2023-08-03 LAB — BACTERIA SPEC AEROBE CULT: NO GROWTH

## 2023-10-31 ENCOUNTER — TELEPHONE (OUTPATIENT)
Dept: UROLOGY | Facility: CLINIC | Age: 68
End: 2023-10-31
Payer: MEDICARE

## 2023-10-31 NOTE — TELEPHONE ENCOUNTER
Lov: 07/12/23    Last refill:  07/12/23 -   Mirabegron ER (MYRBETRIQ) 50 MG tablet sustained-release 24 hour 24 hr tablet, Take 50 mg by mouth Daily., Disp: , Rfl:

## 2023-10-31 NOTE — TELEPHONE ENCOUNTER
"  Caller: Ivan Serrano \"Amari\"    Relationship: Self    Best call back number: [phone]    Requested Prescriptions: MYRBETRIQ 50 MG 90 DAY REFILL  Requested Prescriptions      No prescriptions requested or ordered in this encounter        Pharmacy where request should be sent:  [facility]    Last office visit with prescribing clinician: 7/12/2023   Last telemedicine visit with prescribing clinician: Visit date not found   Next office visit with prescribing clinician: Visit date not found         Does the patient have less than a 3 day supply:  [] Yes  [x] No    Would you like a call back once the refill request has been completed: [] Yes [x] No    If the office needs to give you a call back, can they leave a voicemail: [] Yes [x] No    Maureen Brooks Rep   10/31/23 14:54 EDT         "

## 2023-11-01 DIAGNOSIS — N32.81 OAB (OVERACTIVE BLADDER): ICD-10-CM

## 2023-11-01 DIAGNOSIS — N32.81 OAB (OVERACTIVE BLADDER): Primary | ICD-10-CM

## 2023-11-17 ENCOUNTER — TELEPHONE (OUTPATIENT)
Dept: UROLOGY | Facility: CLINIC | Age: 68
End: 2023-11-17
Payer: MEDICARE

## 2023-11-17 NOTE — TELEPHONE ENCOUNTER
Attempted to call patient to set up an appointment with  prior to surgery. Patient was last seen 8/2, surgery scheduled for 1/18.

## 2023-11-20 ENCOUNTER — TELEPHONE (OUTPATIENT)
Dept: UROLOGY | Facility: CLINIC | Age: 68
End: 2023-11-20
Payer: MEDICARE

## 2023-12-19 ENCOUNTER — OFFICE VISIT (OUTPATIENT)
Dept: UROLOGY | Facility: CLINIC | Age: 68
End: 2023-12-19
Payer: MEDICARE

## 2023-12-19 VITALS
OXYGEN SATURATION: 98 % | DIASTOLIC BLOOD PRESSURE: 72 MMHG | WEIGHT: 216 LBS | HEIGHT: 71 IN | BODY MASS INDEX: 30.24 KG/M2 | HEART RATE: 66 BPM | SYSTOLIC BLOOD PRESSURE: 134 MMHG

## 2023-12-19 DIAGNOSIS — N40.1 BENIGN LOCALIZED PROSTATIC HYPERPLASIA WITH LOWER URINARY TRACT SYMPTOMS (LUTS): Primary | ICD-10-CM

## 2023-12-19 DIAGNOSIS — N32.81 OAB (OVERACTIVE BLADDER): ICD-10-CM

## 2023-12-19 PROCEDURE — 87086 URINE CULTURE/COLONY COUNT: CPT | Performed by: NURSE PRACTITIONER

## 2023-12-19 NOTE — PROGRESS NOTES
Follow Up Office Visit      Patient Name: Ivan Serrano  : 1955   MRN: [id number]     Chief Complaint:    Chief Complaint   Patient presents with    Benign localized prostatic hyperplasia with lower urinary t       Referring Provider: No ref. provider found    History of Present Illness: Ivan Serrano is a 68 y.o. male who presents today for follow up of lower urinary tract symptoms. He previously underwent UroLift procedure several years ago and has continued to report significant LUTS. He is taking double dose Flomax, Finasteride and Myrbetriq. He underwent anatomical evaluation with Dr. Koch in 2023. He has 96 g prostate.   He is scheduled for TURP on  with Dr. Koch.     He reports continued urinary frequency and urgency. He reports occasional urge incontinence when he holds his bladder for too long. He denies nocturia. He reports moderate urinary stream.     UA without infection or blood.   Bladder scan PVR 17cc.   Subjective      Review of System:   Review of Systems   Constitutional: Negative.    Eyes: Negative.    Respiratory: Negative.     Gastrointestinal: Negative.    Endocrine: Negative.    Genitourinary:  Positive for frequency and urgency.   Musculoskeletal: Negative.    Skin: Negative.    Allergic/Immunologic: Negative.    Neurological:  Positive for headaches.   Hematological: Negative.    Psychiatric/Behavioral: Negative.        I have reviewed the ROS documented by my clinical staff, I have updated appropriately and I agree. MELANIA Evangelista    I have reviewed and the following portions of the patient's history were updated as appropriate: past family history, past medical history, past social history, past surgical history and problem list.    Past Medical History:   Past Medical History:   Diagnosis Date    Anemia     Benign localized prostatic hyperplasia with lower urinary tract symptoms (LUTS) 2023    BPH (benign prostatic hyperplasia)     Chronic  prostatitis     Clotting disorder     Coronary artery disease     Elevated PSA     Erectile dysfunction     GERD (gastroesophageal reflux disease)     Hypothyroidism     Nonischemic cardiomyopathy     KEVIN (obstructive sleep apnea)     Platelet disorder     Urgency of urination     Urinary incontinence     Urinary tract infection        Past Surgical History:  Past Surgical History:   Procedure Laterality Date    ACHILLES TENDON REPAIR      APPENDECTOMY      CARDIAC CATHETERIZATION Left 10/20/2022    Procedure: Left Heart Cath;  Surgeon: Wili Joshua MD;  Location: [facility] CATH INVASIVE LOCATION;  Service: Cardiovascular;  Laterality: Left;    CHOLECYSTECTOMY      CYSTOSCOPY      INTERSTIM PERC TEST N/A 6/22/2023    Procedure: PERIPHERAL NERVE EVALUATION FOR OVERACTIV BLADDER;  Surgeon: Susanne Koch MD;  Location: [facility] OR;  Service: Urology;  Laterality: N/A;    SEPTOPLASTY      TONSILLECTOMY         Family History:   family history includes Heart failure in his father; No Known Problems in his mother.   Otherwise pertinent FHx was reviewed and not pertinent to current issue.    Social History:    reports that he has never smoked. He has never been exposed to tobacco smoke. He has never used smokeless tobacco. He reports that he does not drink alcohol and does not use drugs.    Medications:     Current Outpatient Medications:     carvedilol (COREG) 12.5 MG tablet, Take 1 tablet by mouth 2 (Two) Times a Day., Disp: 180 tablet, Rfl: 3    finasteride (PROSCAR) 5 MG tablet, Take 1 tablet by mouth Daily for 360 days., Disp: 90 tablet, Rfl: 3    levothyroxine (SYNTHROID, LEVOTHROID) 175 MCG tablet, Take 1 tablet by mouth Daily., Disp: , Rfl:     Mirabegron ER (MYRBETRIQ) 50 MG tablet sustained-release 24 hour 24 hr tablet, Take 50 mg by mouth Daily for 360 days., Disp: 90 tablet, Rfl: 3    sacubitril-valsartan (ENTRESTO) 24-26 MG tablet, Take 1 tablet by mouth 2 (Two) Times a Day., Disp: 180 tablet, Rfl: 3     "SUMAtriptan (IMITREX) 100 MG tablet, As Needed., Disp: , Rfl:     tamsulosin (FLOMAX) 0.4 MG capsule 24 hr capsule, Take 2 capsules by mouth Every Night for 360 days. Take 2 capsule by mouth every night, Disp: 90 capsule, Rfl: 3    topiramate (TOPAMAX) 50 MG tablet, 100 mg am 50 pm, Disp: , Rfl:     Allergies:   No Known Allergies    IPSS Questionnaire (AUA-7):  Over the past month…    1)  Incomplete Emptying  How often have you had a sensation of not emptying your bladder?  1 - Less than 1 time in 5   2)  Frequency  How often have you had to urinate less than every two hours? 4 - More than half the time   3)  Intermittency  How often have you found you stopped and started again several times when you urinated?  1 - Less than 1 time in 5   4) Urgency  How often have you found it difficult to postpone urination?  4 - More than half the time   5) Weak Stream  How often have you had a weak urinary stream?  2 - Less than half the time   6) Straining  How often have you had to push or strain to begin urination?  1 - Less than 1 time in 5   7) Nocturia  How many times did you typically get up at night to urinate?  0 - None   Total Score:  13   The International Prostate Symptom Score (IPSS) is used to screen, diagnose, track symptoms of benign prostatic hyperplasia (BPH).    0-7 pts (Mild Symptoms)  / 8-19 pts (Moderate) / 20-35 (Severe)    Quality of life due to urinary symptoms:  If you were to spend the rest of your life with your urinary condition the way it is now, how would you feel about that? 4-Mostly Dissatisfied   Urine Leakage (Incontinence) 1-Mild (A few drops a day, no pad use)       Post void residual bladder scan:   17 mL     Objective     Physical Exam:   Vital Signs:   Vitals:    12/19/23 1341   BP: 134/72   Pulse: 66   SpO2: 98%   Weight: 98 kg (216 lb)   Height: 180.3 cm (70.98\")   PainSc: 0-No pain     Body mass index is 30.14 kg/m².     Physical Exam  Vitals and nursing note reviewed. "   Constitutional:       Appearance: Normal appearance.   HENT:      Head: Normocephalic and atraumatic.      Nose: Nose normal.      Mouth/Throat:      Mouth: Mucous membranes are moist.   Eyes:      Pupils: Pupils are equal, round, and reactive to light.   Pulmonary:      Effort: Pulmonary effort is normal.   Abdominal:      General: Abdomen is flat.      Palpations: Abdomen is soft.   Musculoskeletal:         General: Normal range of motion.      Cervical back: Normal range of motion.   Skin:     General: Skin is warm and dry.      Capillary Refill: Capillary refill takes less than 2 seconds.   Neurological:      General: No focal deficit present.      Mental Status: He is alert.   Psychiatric:         Mood and Affect: Mood normal.       Labs:   Brief Urine Lab Results  (Last result in the past 365 days)        Color   Clarity   Blood   Leuk Est   Nitrite   Protein   CREAT   Urine HCG        07/12/23 1229 Yellow   Clear   Negative   Negative   Negative   Negative                   Urine Culture          8/2/2023    16:45   Urine Culture   Urine Culture No growth         Lab Results   Component Value Date    GLUCOSE 102 (H) 06/15/2023    CALCIUM 9.3 06/15/2023     06/15/2023    K 4.4 06/15/2023    K 4.4 06/15/2023    CO2 21.0 (L) 06/15/2023     (H) 06/15/2023    BUN 21 06/15/2023    CREATININE 0.97 06/15/2023    EGFRIFAFRI >60 01/13/2022    EGFRIFNONA >60 01/13/2022    BCR 21.6 06/15/2023    ANIONGAP 8.0 06/15/2023       Lab Results   Component Value Date    WBC 5.38 06/15/2023    HGB 12.1 (L) 06/15/2023    HCT 37.0 (L) 06/15/2023    MCV 90.0 06/15/2023     06/15/2023       Lab Results   Component Value Date    PSA 3.100 03/29/2023    PSA 3.930 10/27/2022    PSA 1.79 09/21/2021       Images:   No Images in the past 120 days found..    Measures:   Tobacco:   Ivan Serrano  reports that he has never smoked. He has never been exposed to tobacco smoke. He has never used smokeless  tobacco..      Urine Incontinence: Patient reports that he is currently experiencing symptoms of urinary incontinence.    Assessment / Plan      Assessment/Plan:   68 y.o. male who presented today for follow up of lower urinary tract symptoms. He is currently managed on Flomax  0.8mg, Finasteride and Myrbetriq. He is scheduled for TURP with Dr. Koch.     We discussed that we may consider Stage 1 if has persistent urinary urgency and frequency despite TURP.     He will need urine culture sent at [facility] on 01/11/24.     Diagnoses and all orders for this visit:    1. Benign localized prostatic hyperplasia with lower urinary tract symptoms (LUTS) (Primary)  -     Urine Culture - Urine, Urine, Clean Catch; Future    2. OAB (overactive bladder)  -     Urine Culture - Urine, Urine, Clean Catch; Future       Follow Up:   Return for TURP 01/25/24.    I spent approximately 30 minutes providing clinical care for this patient; including review of patient's chart and provider documentation, face to face time spent with patient in examination room (obtaining history, performing physical exam, discussing diagnosis and management options), placing orders, and completing patient documentation.     Susanne Koch  [facility] [place]

## 2023-12-20 LAB — BACTERIA SPEC AEROBE CULT: NO GROWTH

## 2024-01-11 ENCOUNTER — PRE-ADMISSION TESTING (OUTPATIENT)
Dept: PREADMISSION TESTING | Facility: HOSPITAL | Age: 69
End: 2024-01-11
Payer: MEDICARE

## 2024-01-11 ENCOUNTER — TELEPHONE (OUTPATIENT)
Dept: CARDIOLOGY | Facility: CLINIC | Age: 69
End: 2024-01-11
Payer: MEDICARE

## 2024-01-11 VITALS — BODY MASS INDEX: 30.28 KG/M2 | WEIGHT: 223.55 LBS | HEIGHT: 72 IN

## 2024-01-11 DIAGNOSIS — N40.1 BENIGN LOCALIZED PROSTATIC HYPERPLASIA WITH LOWER URINARY TRACT SYMPTOMS (LUTS): ICD-10-CM

## 2024-01-11 DIAGNOSIS — R31.1 BENIGN ESSENTIAL MICROSCOPIC HEMATURIA: ICD-10-CM

## 2024-01-11 LAB
ANION GAP SERPL CALCULATED.3IONS-SCNC: 8 MMOL/L (ref 5–15)
APTT PPP: 32.8 SECONDS (ref 22–39)
BILIRUB UR QL STRIP: NEGATIVE
BUN SERPL-MCNC: 24 MG/DL (ref 8–23)
BUN/CREAT SERPL: 22.9 (ref 7–25)
CALCIUM SPEC-SCNC: 9.3 MG/DL (ref 8.6–10.5)
CHLORIDE SERPL-SCNC: 110 MMOL/L (ref 98–107)
CLARITY UR: CLEAR
CO2 SERPL-SCNC: 21 MMOL/L (ref 22–29)
COLOR UR: YELLOW
CREAT SERPL-MCNC: 1.05 MG/DL (ref 0.76–1.27)
DEPRECATED RDW RBC AUTO: 41.1 FL (ref 37–54)
EGFRCR SERPLBLD CKD-EPI 2021: 76.4 ML/MIN/1.73
ERYTHROCYTE [DISTWIDTH] IN BLOOD BY AUTOMATED COUNT: 12.7 % (ref 12.3–15.4)
GLUCOSE SERPL-MCNC: 113 MG/DL (ref 65–99)
GLUCOSE UR STRIP-MCNC: NEGATIVE MG/DL
HCT VFR BLD AUTO: 38.1 % (ref 37.5–51)
HGB BLD-MCNC: 12.7 G/DL (ref 13–17.7)
HGB UR QL STRIP.AUTO: NEGATIVE
INR PPP: 1.08 (ref 0.89–1.12)
KETONES UR QL STRIP: NEGATIVE
LEUKOCYTE ESTERASE UR QL STRIP.AUTO: NEGATIVE
MCH RBC QN AUTO: 29.4 PG (ref 26.6–33)
MCHC RBC AUTO-ENTMCNC: 33.3 G/DL (ref 31.5–35.7)
MCV RBC AUTO: 88.2 FL (ref 79–97)
NITRITE UR QL STRIP: NEGATIVE
PH UR STRIP.AUTO: 7 [PH] (ref 5–8)
PLATELET # BLD AUTO: 172 10*3/MM3 (ref 140–450)
PMV BLD AUTO: 10.4 FL (ref 6–12)
POTASSIUM SERPL-SCNC: 4.5 MMOL/L (ref 3.5–5.2)
PROT UR QL STRIP: NEGATIVE
PROTHROMBIN TIME: 14.2 SECONDS (ref 12.2–14.5)
QT INTERVAL: 380 MS
QTC INTERVAL: 369 MS
RBC # BLD AUTO: 4.32 10*6/MM3 (ref 4.14–5.8)
SODIUM SERPL-SCNC: 139 MMOL/L (ref 136–145)
SP GR UR STRIP: 1.01 (ref 1–1.03)
UROBILINOGEN UR QL STRIP: NORMAL
WBC NRBC COR # BLD AUTO: 5.37 10*3/MM3 (ref 3.4–10.8)

## 2024-01-11 PROCEDURE — 85027 COMPLETE CBC AUTOMATED: CPT

## 2024-01-11 PROCEDURE — 36415 COLL VENOUS BLD VENIPUNCTURE: CPT

## 2024-01-11 PROCEDURE — 93005 ELECTROCARDIOGRAM TRACING: CPT

## 2024-01-11 PROCEDURE — 85610 PROTHROMBIN TIME: CPT

## 2024-01-11 PROCEDURE — 81003 URINALYSIS AUTO W/O SCOPE: CPT

## 2024-01-11 PROCEDURE — 87086 URINE CULTURE/COLONY COUNT: CPT

## 2024-01-11 PROCEDURE — 85730 THROMBOPLASTIN TIME PARTIAL: CPT

## 2024-01-11 PROCEDURE — 80048 BASIC METABOLIC PNL TOTAL CA: CPT

## 2024-01-11 PROCEDURE — 93010 ELECTROCARDIOGRAM REPORT: CPT | Performed by: INTERNAL MEDICINE

## 2024-01-11 NOTE — PAT
An arrival time for procedure was not provided during PAT visit. If patient had any questions or concerns about their arrival time, they were instructed to contact their surgeon/physician.  Additionally, if the patient referred to an arrival time that was acquired from their my chart account, patient was encouraged to verify that time with their surgeon/physician. Arrival times are NOT provided in Pre Admission Testing Department.    Patient viewed general PAT education video as instructed in their preoperative information received from their surgeon.  Patient stated the general PAT education video was viewed in its entirety and survey completed.  Copies of PAT general education handouts (Incentive Spirometry, Meds to Beds Program, Patient Belongings, Pre-op skin preparation instructions, Blood Glucose testing, Visitor policy, Surgery FAQ, Code H) distributed to patient if not printed. Education related to the PAT pass and skin preparation for surgery (if applicable) completed in PAT as a reinforcement to PAT education video. Patient instructed to return PAT pass provided today as well as completed skin preparation sheet (if applicable) on the day of procedure.     Additionally if patient had not viewed video yet but intended to view it at home or in our waiting area, then referred them to the handout with QR code/link provided during PAT visit.  Instructed patient to complete survey after viewing the video in its entirety.  Encouraged patient/family to read PAT general education handouts thoroughly and notify PAT staff with any questions or concerns. Patient verbalized understanding of all information and priority content.    Patient denies any current skin issues.     EKG IN CHART

## 2024-01-11 NOTE — TELEPHONE ENCOUNTER
Requesting pre op risk assessment for:    CYSTOSCOPY TRANSURETHRAL RESECTION OF PROSTATE   With  Susanne Koch MD     Scheduled for 01/25/2024  Heart cath Oct 2022  Echo Jan 2023    Is not on any anti coag

## 2024-01-12 LAB — BACTERIA SPEC AEROBE CULT: NO GROWTH

## 2024-01-23 ENCOUNTER — TELEPHONE (OUTPATIENT)
Dept: UROLOGY | Facility: CLINIC | Age: 69
End: 2024-01-23
Payer: MEDICARE

## 2024-01-23 NOTE — TELEPHONE ENCOUNTER
"----- Message from Susanne Koch MD sent at 1/23/2024  9:42 AM EST -----  Make sure he follows up prior to 2/29 for PATs please.   ----- Message -----  From: Sofia Patel RegSched Rep  Sent: 1/22/2024   2:38 PM EST  To: Susanne Koch MD    FYI, patient called and cancelled surgery 1/25 because \"he has a lot going on\", rescheduled to 2/29.      "

## 2024-01-23 NOTE — TELEPHONE ENCOUNTER
Patient is scheduled for PAT 2/22. Per  he wants patient to come by the office that day to leave a urine and to run a culture. LVM to inform patient.

## 2024-01-24 RX ORDER — CARVEDILOL 12.5 MG/1
12.5 TABLET ORAL 2 TIMES DAILY
Qty: 180 TABLET | Refills: 3 | Status: SHIPPED | OUTPATIENT
Start: 2024-01-24

## 2024-01-25 DIAGNOSIS — N40.0 BENIGN PROSTATIC HYPERPLASIA, UNSPECIFIED WHETHER LOWER URINARY TRACT SYMPTOMS PRESENT: ICD-10-CM

## 2024-01-25 DIAGNOSIS — R33.9 INCOMPLETE BLADDER EMPTYING: ICD-10-CM

## 2024-01-25 DIAGNOSIS — N32.81 OAB (OVERACTIVE BLADDER): ICD-10-CM

## 2024-01-25 RX ORDER — FINASTERIDE 5 MG/1
5 TABLET, FILM COATED ORAL DAILY
Qty: 90 TABLET | Refills: 3 | Status: SHIPPED | OUTPATIENT
Start: 2024-01-25

## 2024-01-25 NOTE — TELEPHONE ENCOUNTER
Rx Refill Note  Requested Prescriptions     Pending Prescriptions Disp Refills    finasteride (PROSCAR) 5 MG tablet [Pharmacy Med Name: FINASTERIDE TABS 5MG] 90 tablet 3     Sig: TAKE 1 TABLET DAILY      Last office visit with prescribing clinician: 12/19/2023   Next office visit with prescribing clinician:       Tonia Chinchilla MA  01/25/24, 08:11 EST

## 2024-02-19 RX ORDER — SACUBITRIL AND VALSARTAN 24; 26 MG/1; MG/1
1 TABLET, FILM COATED ORAL 2 TIMES DAILY
Qty: 180 TABLET | Refills: 3 | Status: SHIPPED | OUTPATIENT
Start: 2024-02-19 | End: 2024-02-21 | Stop reason: SDUPTHER

## 2024-02-19 NOTE — PROGRESS NOTES
Subjective:     Encounter Date:02/21/2024    Primary Care Physician: Kira Daugherty APRN      Patient ID: Ivan Serrano is a 68 y.o. male.    Chief Complaint:Cardiomyopathy    Problem List:     Nonischemic cardiomyopathy   09/27/22, PANTERA Chahal MD revealing mildly dilated LV with EF 35 %, mild AR, trace to mjld MR.  10/20/2022 [facility] no significant CAD EF 30%.  EF 55% 1/2023  COVID 19- January 2022 - high flow O2  Hypothyroidism  BPH  Low Back Pain  Migraine Headaches  Platelet Aggregation.  Obstructive Sleep Apnea- dental device.  Chronic Anemia  Surgeries:  Achilles tendon repair  Cholecystectomy  Septoplasty  Tonsillectomy        No Known Allergies      Current Outpatient Medications:     carvedilol (COREG) 12.5 MG tablet, TAKE 1 TABLET TWICE A DAY, Disp: 180 tablet, Rfl: 3    Entresto 24-26 MG tablet, TAKE 1 TABLET TWICE A DAY, Disp: 180 tablet, Rfl: 3    finasteride (PROSCAR) 5 MG tablet, TAKE 1 TABLET DAILY, Disp: 90 tablet, Rfl: 3    levothyroxine (SYNTHROID, LEVOTHROID) 175 MCG tablet, Take 1 tablet by mouth Daily., Disp: , Rfl:     Mirabegron ER (MYRBETRIQ) 50 MG tablet sustained-release 24 hour 24 hr tablet, Take 50 mg by mouth Daily for 360 days., Disp: 90 tablet, Rfl: 3    SUMAtriptan (IMITREX) 100 MG tablet, As Needed., Disp: , Rfl:     tamsulosin (FLOMAX) 0.4 MG capsule 24 hr capsule, Take 2 capsules by mouth Every Night for 360 days. Take 2 capsule by mouth every night, Disp: 90 capsule, Rfl: 3    topiramate (TOPAMAX) 50 MG tablet, 100 mg am 50 pm, Disp: , Rfl:         History of Present Illness    Patient is a 68-year-old  male who presents today for annual follow-up of nonischemic cardiomyopathy.  Since last being seen patient notes overall doing well/at his baseline.  He denies any increase shortness of breath.  Has continued exertional chest discomfort which is chronic since prior to his cardiac catheterization that showed no flow-limiting disease.  No reported syncope, presyncope.  " Has some occasional edema, left greater than right, that is typically improved with elevation of extremities.  Notes that he is compliant with his medications.    The following portions of the patient's history were reviewed and updated as appropriate: allergies, current medications, past family history, past medical history, past social history, past surgical history and problem list.      Social History     Tobacco Use    Smoking status: Never     Passive exposure: Never    Smokeless tobacco: Never   Vaping Use    Vaping Use: Never used   Substance Use Topics    Alcohol use: Never    Drug use: Never         ROS       Objective:   /75   Pulse 64   Ht 180.3 cm (71\")   Wt 99 kg (218 lb 3.2 oz)   SpO2 97%   BMI 30.43 kg/m²         Vitals reviewed.   Constitutional:       Appearance: Well-developed.   Neck:      Vascular: No JVD.      Trachea: No tracheal deviation.   Pulmonary:      Effort: Pulmonary effort is normal.      Breath sounds: Normal breath sounds.   Cardiovascular:      Normal rate. Regular rhythm.      Murmurs: There is no murmur.      Comments: Left greater than right edema.  Left ankle 1+.  Abdominal:      General: Bowel sounds are normal.      Tenderness: There is no abdominal tenderness.   Musculoskeletal:         General: No deformity. Neurological:      Mental Status: Alert and oriented to person, place, and time.         Procedures          Assessment:   Assessment & Plan      Diagnoses and all orders for this visit:    1. Dilated cardiomyopathy (Primary), normalized LVEF by echocardiogram 2023.  On Entresto.    2. Primary hypertension, controlled.  On carvedilol.      Plan:  Patient is overall stable from cardiac standpoint.  Continue current cardiac medications.  Follow-up in 1 years time or sooner if needed.     MELANIA Unger     Advance Care Planning   ACP discussion was held with the patient during this visit. Patient does not have an advance directive, declines further " assistance.        Dictated utilizing Dragon dictation

## 2024-02-21 ENCOUNTER — OFFICE VISIT (OUTPATIENT)
Dept: CARDIOLOGY | Facility: CLINIC | Age: 69
End: 2024-02-21
Payer: MEDICARE

## 2024-02-21 ENCOUNTER — TELEPHONE (OUTPATIENT)
Dept: UROLOGY | Facility: CLINIC | Age: 69
End: 2024-02-21
Payer: MEDICARE

## 2024-02-21 VITALS
HEIGHT: 71 IN | SYSTOLIC BLOOD PRESSURE: 119 MMHG | WEIGHT: 218.2 LBS | HEART RATE: 64 BPM | OXYGEN SATURATION: 97 % | DIASTOLIC BLOOD PRESSURE: 75 MMHG | BODY MASS INDEX: 30.55 KG/M2

## 2024-02-21 DIAGNOSIS — I42.0 DILATED CARDIOMYOPATHY: Primary | ICD-10-CM

## 2024-02-21 DIAGNOSIS — I10 PRIMARY HYPERTENSION: ICD-10-CM

## 2024-02-21 PROCEDURE — 1160F RVW MEDS BY RX/DR IN RCRD: CPT | Performed by: NURSE PRACTITIONER

## 2024-02-21 PROCEDURE — 1159F MED LIST DOCD IN RCRD: CPT | Performed by: NURSE PRACTITIONER

## 2024-02-21 PROCEDURE — 99214 OFFICE O/P EST MOD 30 MIN: CPT | Performed by: NURSE PRACTITIONER

## 2024-02-21 RX ORDER — CARVEDILOL 12.5 MG/1
12.5 TABLET ORAL 2 TIMES DAILY
Qty: 180 TABLET | Refills: 3 | Status: SHIPPED | OUTPATIENT
Start: 2024-02-21

## 2024-02-21 RX ORDER — SACUBITRIL AND VALSARTAN 24; 26 MG/1; MG/1
1 TABLET, FILM COATED ORAL 2 TIMES DAILY
Qty: 180 TABLET | Refills: 3 | Status: SHIPPED | OUTPATIENT
Start: 2024-02-21

## 2024-02-21 NOTE — TELEPHONE ENCOUNTER
Spoke with patient and he had to cancel because of problems he is having with his son. He is unsure when it will be resolved, I advised patient we could go ahead and just schedule an appointment. He could call in sooner if he needed to reschedule.

## 2024-02-21 NOTE — LETTER
February 21, 2024       No Recipients    Patient: Ivan Serrano   YOB: 1955   Date of Visit: 2/21/2024     Dear MELANIA Escobedo:       Thank you for referring Ivan Serrano to me for evaluation. Below are the relevant portions of my assessment and plan of care.    If you have questions, please do not hesitate to call me. I look forward to following Ivan along with you.         Sincerely,        MELANIA Unger        CC:   No Recipients    Alyx Talbot APRN  02/21/24 1258  Sign when Signing Visit  Subjective:     Encounter Date:02/21/2024    Primary Care Physician: Kira Daugherty APRN      Patient ID: Ivan Serarno is a 68 y.o. male.    Chief Complaint:Cardiomyopathy    Problem List:     Nonischemic cardiomyopathy   09/27/22, PANTERA Chahal MD revealing mildly dilated LV with EF 35 %, mild AR, trace to mjld MR.  10/20/2022 [facility] no significant CAD EF 30%.  EF 55% 1/2023  COVID 19- January 2022 - high flow O2  Hypothyroidism  BPH  Low Back Pain  Migraine Headaches  Platelet Aggregation.  Obstructive Sleep Apnea- dental device.  Chronic Anemia  Surgeries:  Achilles tendon repair  Cholecystectomy  Septoplasty  Tonsillectomy        No Known Allergies      Current Outpatient Medications:   •  carvedilol (COREG) 12.5 MG tablet, TAKE 1 TABLET TWICE A DAY, Disp: 180 tablet, Rfl: 3  •  Entresto 24-26 MG tablet, TAKE 1 TABLET TWICE A DAY, Disp: 180 tablet, Rfl: 3  •  finasteride (PROSCAR) 5 MG tablet, TAKE 1 TABLET DAILY, Disp: 90 tablet, Rfl: 3  •  levothyroxine (SYNTHROID, LEVOTHROID) 175 MCG tablet, Take 1 tablet by mouth Daily., Disp: , Rfl:   •  Mirabegron ER (MYRBETRIQ) 50 MG tablet sustained-release 24 hour 24 hr tablet, Take 50 mg by mouth Daily for 360 days., Disp: 90 tablet, Rfl: 3  •  SUMAtriptan (IMITREX) 100 MG tablet, As Needed., Disp: , Rfl:   •  tamsulosin (FLOMAX) 0.4 MG capsule 24 hr capsule, Take 2 capsules by mouth Every Night for 360 days. Take 2 capsule by  "mouth every night, Disp: 90 capsule, Rfl: 3  •  topiramate (TOPAMAX) 50 MG tablet, 100 mg am 50 pm, Disp: , Rfl:         History of Present Illness    Patient is a 68-year-old  male who presents today for annual follow-up of nonischemic cardiomyopathy.  Since last being seen patient notes overall doing well/at his baseline.  He denies any increase shortness of breath.  Has continued exertional chest discomfort which is chronic since prior to his cardiac catheterization that showed no flow-limiting disease.  No reported syncope, presyncope.  Has some occasional edema, left greater than right, that is typically improved with elevation of extremities.  Notes that he is compliant with his medications.    The following portions of the patient's history were reviewed and updated as appropriate: allergies, current medications, past family history, past medical history, past social history, past surgical history and problem list.      Social History     Tobacco Use   • Smoking status: Never     Passive exposure: Never   • Smokeless tobacco: Never   Vaping Use   • Vaping Use: Never used   Substance Use Topics   • Alcohol use: Never   • Drug use: Never         ROS       Objective:   /75   Pulse 64   Ht 180.3 cm (71\")   Wt 99 kg (218 lb 3.2 oz)   SpO2 97%   BMI 30.43 kg/m²         Vitals reviewed.   Constitutional:       Appearance: Well-developed.   Neck:      Vascular: No JVD.      Trachea: No tracheal deviation.   Pulmonary:      Effort: Pulmonary effort is normal.      Breath sounds: Normal breath sounds.   Cardiovascular:      Normal rate. Regular rhythm.      Murmurs: There is no murmur.      Comments: Left greater than right edema.  Left ankle 1+.  Abdominal:      General: Bowel sounds are normal.      Tenderness: There is no abdominal tenderness.   Musculoskeletal:         General: No deformity. Neurological:      Mental Status: Alert and oriented to person, place, and time.         Procedures       "    Assessment:   Assessment & Plan     Diagnoses and all orders for this visit:    1. Dilated cardiomyopathy (Primary), normalized LVEF by echocardiogram 2023.  On Entresto.    2. Primary hypertension, controlled.  On carvedilol.      Plan:  Patient is overall stable from cardiac standpoint.  Continue current cardiac medications.  Follow-up in 1 years time or sooner if needed.     MELANIA Unger     Advance Care Planning  ACP discussion was held with the patient during this visit. Patient does not have an advance directive, declines further assistance.        Dictated utilizing Dragon dictation

## 2024-04-08 ENCOUNTER — OFFICE VISIT (OUTPATIENT)
Dept: UROLOGY | Facility: CLINIC | Age: 69
End: 2024-04-08
Payer: MEDICARE

## 2024-04-08 VITALS — OXYGEN SATURATION: 98 % | BODY MASS INDEX: 30.52 KG/M2 | WEIGHT: 218 LBS | HEART RATE: 64 BPM | HEIGHT: 71 IN

## 2024-04-08 DIAGNOSIS — R39.9 LOWER URINARY TRACT SYMPTOMS (LUTS): Primary | ICD-10-CM

## 2024-04-08 LAB
BILIRUB BLD-MCNC: NEGATIVE MG/DL
CLARITY, POC: CLEAR
COLOR UR: YELLOW
EXPIRATION DATE: NORMAL
GLUCOSE UR STRIP-MCNC: NEGATIVE MG/DL
KETONES UR QL: NEGATIVE
LEUKOCYTE EST, POC: NEGATIVE
Lab: NORMAL
NITRITE UR-MCNC: NEGATIVE MG/ML
PH UR: 6 [PH] (ref 5–8)
PROT UR STRIP-MCNC: NEGATIVE MG/DL
RBC # UR STRIP: NEGATIVE /UL
SP GR UR: 1 (ref 1–1.03)
UROBILINOGEN UR QL: NORMAL

## 2024-04-08 NOTE — PROGRESS NOTES
Follow Up Office Visit      Patient Name: Ivan Serrano  : 1955   MRN: [id number]     Chief Complaint:    Chief Complaint   Patient presents with    Benign localized prostatic hyperplasia with lower urinary t       Referring Provider: No ref. provider found    History of Present Illness: Ivan Serrano is a 69 y.o. male who presents today for follow up of his LUTS.  He reports he has frequency and urgency.  He reports he is much better than when we started.  He underwent Urolift in 2019.  However, he continues to complain of intermittency, straining, urgency and frequency.  The combination of Myrbetriq, tamsulosin, and finasteride.    He is still somewhat hesitant to move forward.      Subjective      Review of System:   Review of Systems   Genitourinary:  Negative for decreased urine volume, difficulty urinating, dysuria, enuresis, flank pain, frequency, hematuria and urgency.      I have reviewed the ROS documented by my clinical staff, I have updated appropriately and I agree. Susanne Koch MD    I have reviewed and the following portions of the patient's history were updated as appropriate: past family history, past medical history, past social history, past surgical history and problem list.    Past Medical History:   Past Medical History:   Diagnosis Date    Anemia     Benign localized prostatic hyperplasia with lower urinary tract symptoms (LUTS) 2023    BPH (benign prostatic hyperplasia)     Chronic prostatitis     Clotting disorder     Coronary artery disease     Elevated PSA     Erectile dysfunction     GERD (gastroesophageal reflux disease)     Hypothyroidism     Nonischemic cardiomyopathy     KEVIN (obstructive sleep apnea)     WEARS DENTAL DEVICE    Platelet disorder     Urgency of urination     Urinary incontinence     Urinary tract infection        Past Surgical History:  Past Surgical History:   Procedure Laterality Date    ACHILLES TENDON REPAIR      APPENDECTOMY       CARDIAC CATHETERIZATION Left 10/20/2022    Procedure: Left Heart Cath;  Surgeon: Wili Joshua MD;  Location:  [facility];  Service: Cardiovascular;  Laterality: Left;    CHOLECYSTECTOMY      COLONOSCOPY      CYSTOSCOPY      INTERSTIM PERC TEST N/A 06/22/2023    Procedure: PERIPHERAL NERVE EVALUATION FOR OVERACTIV BLADDER;  Surgeon: Susanne Koch MD;  Location:  [facility];  Service: Urology;  Laterality: N/A;    SEPTOPLASTY      TONSILLECTOMY         Family History:   family history includes Heart failure in his father; No Known Problems in his mother.   Otherwise pertinent FHx was reviewed and not pertinent to current issue.    Social History:    reports that he has never smoked. He has never been exposed to tobacco smoke. He has never used smokeless tobacco. He reports that he does not drink alcohol and does not use drugs.    Medications:     Current Outpatient Medications:     carvedilol (COREG) 12.5 MG tablet, Take 1 tablet by mouth 2 (Two) Times a Day., Disp: 180 tablet, Rfl: 3    finasteride (PROSCAR) 5 MG tablet, TAKE 1 TABLET DAILY, Disp: 90 tablet, Rfl: 3    levothyroxine (SYNTHROID, LEVOTHROID) 175 MCG tablet, Take 1 tablet by mouth Daily., Disp: , Rfl:     Mirabegron ER (MYRBETRIQ) 50 MG tablet sustained-release 24 hour 24 hr tablet, Take 50 mg by mouth Daily for 360 days., Disp: 90 tablet, Rfl: 3    sacubitril-valsartan (Entresto) 24-26 MG tablet, Take 1 tablet by mouth 2 (Two) Times a Day., Disp: 180 tablet, Rfl: 3    SUMAtriptan (IMITREX) 100 MG tablet, As Needed., Disp: , Rfl:     tamsulosin (FLOMAX) 0.4 MG capsule 24 hr capsule, Take 2 capsules by mouth Every Night for 360 days. Take 2 capsule by mouth every night, Disp: 90 capsule, Rfl: 3    topiramate (TOPAMAX) 50 MG tablet, 100 mg am 50 pm, Disp: , Rfl:     Allergies:   No Known Allergies    IPSS Questionnaire (AUA-7):  Over the past month…    1)  Incomplete Emptying  How often have you had a sensation of not emptying your  "bladder?  2 - Less than half the time   2)  Frequency  How often have you had to urinate less than every two hours? 4 - More than half the time   3)  Intermittency  How often have you found you stopped and started again several times when you urinated?  2 - Less than half the time   4) Urgency  How often have you found it difficult to postpone urination?  4 - More than half the time   5) Weak Stream  How often have you had a weak urinary stream?  2 - Less than half the time   6) Straining  How often have you had to push or strain to begin urination?  1 - Less than 1 time in 5   7) Nocturia  How many times did you typically get up at night to urinate?  0 - None   Total Score:  15   The International Prostate Symptom Score (IPSS) is used to screen, diagnose, track symptoms of benign prostatic hyperplasia (BPH).    0-7 pts (Mild Symptoms)  / 8-19 pts (Moderate) / 20-35 (Severe)    Quality of life due to urinary symptoms:  If you were to spend the rest of your life with your urinary condition the way it is now, how would you feel about that? 4-Mostly Dissatisfied   Urine Leakage (Incontinence) 1-Mild (A few drops a day, no pad use)       Post void residual bladder scan:   013mL     Objective     Physical Exam:   Vital Signs:   Vitals:    04/08/24 1012   Pulse: 64   SpO2: 98%   Weight: 98.9 kg (218 lb)   Height: 180.3 cm (70.98\")   PainSc: 0-No pain     Body mass index is 30.42 kg/m².     Physical Exam  Vitals and nursing note reviewed.   Constitutional:       General: He is awake. He is not in acute distress.     Appearance: Normal appearance. He is well-developed.   HENT:      Head: Normocephalic and atraumatic.      Right Ear: External ear normal.      Left Ear: External ear normal.      Nose: Nose normal.   Eyes:      Conjunctiva/sclera: Conjunctivae normal.   Pulmonary:      Effort: Pulmonary effort is normal.   Abdominal:      General: There is no distension.      Palpations: Abdomen is soft. There is no mass.      " Tenderness: There is no abdominal tenderness. There is no right CVA tenderness, left CVA tenderness, guarding or rebound.      Hernia: No hernia is present. There is no hernia in the left inguinal area or right inguinal area.   Genitourinary:     Pubic Area: No rash.       Rectum: No mass or tenderness. Normal anal tone.   Musculoskeletal:      Cervical back: Normal range of motion.   Lymphadenopathy:      Lower Body: No right inguinal adenopathy. No left inguinal adenopathy.   Skin:     General: Skin is warm.   Neurological:      General: No focal deficit present.      Mental Status: He is alert and oriented to person, place, and time.   Psychiatric:         Behavior: Behavior normal. Behavior is cooperative.         Labs:   Brief Urine Lab Results  (Last result in the past 365 days)        Color   Clarity   Blood   Leuk Est   Nitrite   Protein   CREAT   Urine HCG        01/11/24 1352 Yellow   Clear   Negative   Negative   Negative   Negative                   Urine Culture          8/2/2023    16:45 12/19/2023    16:36 1/11/2024    13:52   Urine Culture   Urine Culture No growth  No growth  No growth         Lab Results   Component Value Date    GLUCOSE 113 (H) 01/11/2024    CALCIUM 9.3 01/11/2024     01/11/2024    K 4.5 01/11/2024    CO2 21.0 (L) 01/11/2024     (H) 01/11/2024    BUN 24 (H) 01/11/2024    CREATININE 1.05 01/11/2024    EGFRIFAFRI >60 01/13/2022    EGFRIFNONA >60 01/13/2022    BCR 22.9 01/11/2024    ANIONGAP 8.0 01/11/2024       Lab Results   Component Value Date    WBC 5.37 01/11/2024    HGB 12.7 (L) 01/11/2024    HCT 38.1 01/11/2024    MCV 88.2 01/11/2024     01/11/2024       Lab Results   Component Value Date    PSA 3.100 03/29/2023    PSA 3.930 10/27/2022    PSA 1.79 09/21/2021       Images:   No Images in the past 120 days found..    Measures:   Tobacco:   Ivan Serrano  reports that he has never smoked. He has never been exposed to tobacco smoke. He has never used  smokeless tobacco.      Urine Incontinence: Patient reports that he is not currently experiencing any symptoms of urinary incontinence.         Assessment / Plan      Assessment/Plan:   69 y.o. male who presented today for follow up of his LUTS.  I discussed his options and he is still thinking about moving forward with TURP.  He reports he still needs to think about if he wants to move forward.  I have sent him a video and literature.    I will have him call us if he is moving forward. I will see him back in 6 months.      Diagnoses and all orders for this visit:    1. Lower urinary tract symptoms (LUTS) (Primary)         Follow Up:   Return in about 6 months (around 10/8/2024) for Recheck.    I spent approximately 30 minutes providing clinical care for this patient; including review of patient's chart and provider documentation, face to face time spent with patient in examination room (obtaining history, performing physical exam, discussing diagnosis and management options), placing orders, and completing patient documentation.     Susanne Koch MD  [facility]

## 2024-04-23 DIAGNOSIS — N40.1 BENIGN LOCALIZED PROSTATIC HYPERPLASIA WITH LOWER URINARY TRACT SYMPTOMS (LUTS): ICD-10-CM

## 2024-04-23 RX ORDER — TAMSULOSIN HYDROCHLORIDE 0.4 MG/1
1 CAPSULE ORAL DAILY
Qty: 90 CAPSULE | Refills: 3 | Status: SHIPPED | OUTPATIENT
Start: 2024-04-23

## 2024-04-23 NOTE — TELEPHONE ENCOUNTER
Rx Refill Note  Requested Prescriptions     Pending Prescriptions Disp Refills    tamsulosin (FLOMAX) 0.4 MG capsule 24 hr capsule [Pharmacy Med Name: TAMSULOSIN HCL CAPS 0.4MG] 90 capsule 3     Sig: TAKE 1 CAPSULE DAILY      Last office visit with prescribing clinician: 4/8/2024   Last telemedicine visit with prescribing clinician: Visit date not found   Next office visit with prescribing clinician: 10/9/2024     Rona Bean MA  04/23/24, 08:13 EDT

## 2024-10-15 DIAGNOSIS — N32.81 OAB (OVERACTIVE BLADDER): ICD-10-CM

## 2024-10-15 RX ORDER — MIRABEGRON 50 MG/1
50 TABLET, FILM COATED, EXTENDED RELEASE ORAL DAILY
Qty: 90 TABLET | Refills: 3 | Status: SHIPPED | OUTPATIENT
Start: 2024-10-15

## 2024-10-15 NOTE — TELEPHONE ENCOUNTER
Rx Refill Note  Requested Prescriptions     Pending Prescriptions Disp Refills    Myrbetriq 50 MG tablet sustained-release 24 hour 24 hr tablet [Pharmacy Med Name: MYRBETRIQ TABS 50MG] 90 tablet 3     Sig: TAKE 1 TABLET DAILY      Last office visit with prescribing clinician: 4/8/2024   Last telemedicine visit with prescribing clinician: Visit date not found   Next office visit with prescribing clinician: 10/16/2024     Flor Jones  10/15/24, 08:12 EDT

## 2024-10-16 ENCOUNTER — LAB (OUTPATIENT)
Dept: LAB | Facility: HOSPITAL | Age: 69
End: 2024-10-16
Payer: MEDICARE

## 2024-10-16 ENCOUNTER — OFFICE VISIT (OUTPATIENT)
Dept: UROLOGY | Facility: CLINIC | Age: 69
End: 2024-10-16
Payer: MEDICARE

## 2024-10-16 VITALS — HEIGHT: 71 IN | BODY MASS INDEX: 30.03 KG/M2 | WEIGHT: 214.5 LBS

## 2024-10-16 DIAGNOSIS — N40.1 BENIGN LOCALIZED PROSTATIC HYPERPLASIA WITH LOWER URINARY TRACT SYMPTOMS (LUTS): ICD-10-CM

## 2024-10-16 DIAGNOSIS — N40.1 BENIGN LOCALIZED PROSTATIC HYPERPLASIA WITH LOWER URINARY TRACT SYMPTOMS (LUTS): Primary | ICD-10-CM

## 2024-10-16 LAB — PSA SERPL-MCNC: 2.38 NG/ML (ref 0–4)

## 2024-10-16 PROCEDURE — 36415 COLL VENOUS BLD VENIPUNCTURE: CPT

## 2024-10-16 PROCEDURE — 84153 ASSAY OF PSA TOTAL: CPT

## 2024-10-16 NOTE — PROGRESS NOTES
LUTS Male Office Visit      Patient Name: Ivan Serrano  : 1955   MRN: [id number]     Chief Complaint:  Lower Urinary Tract Symptoms.   Chief Complaint   Patient presents with    Lower urinary tract symptoms (LUTS)        Referring Provider: No ref. provider found    History of Present Illness: Mr. Serrano is a 69 y.o. male with history of lower urinary tract symptoms.   He underwent Urolift in 2019.  He reports he still has some frequency, urgency, intermittency, and some weak stream.  He is better than when we first met previously.  He is on myrbetriq, finasteride, and myrbetriq.     He reports he is not quite ready for TURP still.        Subjective      Review of System:   Review of Systems   I have reviewed the ROS documented by my clinical staff, I have updated appropriately and I agree. Susanne Koch MD    Past Medical History:  Past Medical History:   Diagnosis Date    Anemia     Benign localized prostatic hyperplasia with lower urinary tract symptoms (LUTS) 2023    BPH (benign prostatic hyperplasia)     Chronic prostatitis     Clotting disorder     Coronary artery disease     Elevated PSA     Erectile dysfunction     GERD (gastroesophageal reflux disease)     Hypothyroidism     Nonischemic cardiomyopathy     KEVIN (obstructive sleep apnea)     WEARS DENTAL DEVICE    Platelet disorder     Urgency of urination     Urinary incontinence     Urinary tract infection        Past Surgical History:  Past Surgical History:   Procedure Laterality Date    ACHILLES TENDON REPAIR      APPENDECTOMY      CARDIAC CATHETERIZATION Left 10/20/2022    Procedure: Left Heart Cath;  Surgeon: Wili Joshua MD;  Location: [facility] INVASIVE LOCATION;  Service: Cardiovascular;  Laterality: Left;    CHOLECYSTECTOMY      COLONOSCOPY      CYSTOSCOPY      INTERSTIM PERC TEST N/A 2023    Procedure: PERIPHERAL NERVE EVALUATION FOR OVERACTIV BLADDER;  Surgeon: Susanne Koch MD;  Location: [facility] OR;   Service: Urology;  Laterality: N/A;    SEPTOPLASTY      TONSILLECTOMY         Medications:    Current Outpatient Medications:     carvedilol (COREG) 12.5 MG tablet, Take 1 tablet by mouth 2 (Two) Times a Day., Disp: 180 tablet, Rfl: 3    finasteride (PROSCAR) 5 MG tablet, TAKE 1 TABLET DAILY, Disp: 90 tablet, Rfl: 3    levothyroxine (SYNTHROID, LEVOTHROID) 175 MCG tablet, Take 1 tablet by mouth Daily., Disp: , Rfl:     Myrbetriq 50 MG tablet sustained-release 24 hour 24 hr tablet, TAKE 1 TABLET DAILY, Disp: 90 tablet, Rfl: 3    sacubitril-valsartan (Entresto) 24-26 MG tablet, Take 1 tablet by mouth 2 (Two) Times a Day., Disp: 180 tablet, Rfl: 3    SUMAtriptan (IMITREX) 100 MG tablet, As Needed., Disp: , Rfl:     tamsulosin (FLOMAX) 0.4 MG capsule 24 hr capsule, TAKE 1 CAPSULE DAILY, Disp: 90 capsule, Rfl: 3    topiramate (TOPAMAX) 50 MG tablet, 100 mg am 50 pm, Disp: , Rfl:     Allergies:  No Known Allergies    Social History:  Social History     Socioeconomic History    Marital status:    Tobacco Use    Smoking status: Never     Passive exposure: Never    Smokeless tobacco: Never   Vaping Use    Vaping status: Never Used   Substance and Sexual Activity    Alcohol use: Never    Drug use: Never    Sexual activity: Defer       Family History:  Family History   Problem Relation Age of Onset    No Known Problems Mother     Heart failure Father        IPSS Questionnaire (AUA-7):  Over the past month…    1)  Incomplete Emptying  How often have you had a sensation of not emptying your bladder?  1 - Less than 1 time in 5   2)  Frequency  How often have you had to urinate less than every two hours? 3 - About half the time   3)  Intermittency  How often have you found you stopped and started again several times when you urinated?  1 - Less than 1 time in 5   4) Urgency  How often have you found it difficult to postpone urination?  2 - Less than half the time   5) Weak Stream  How often have you had a weak urinary  "stream?  2 - Less than half the time   6) Straining  How often have you had to push or strain to begin urination?  1 - Less than 1 time in 5   7) Nocturia  How many times did you typically get up at night to urinate?  1 - 1 time   Total Score:  11   The International Prostate Symptom Score (IPSS) is used to screen, diagnose, track symptoms of benign prostatic hyperplasia (BPH).    0-7 pts (Mild Symptoms)  / 8-19 pts (Moderate) / 20-35 (Severe)    Quality of life due to urinary symptoms:  If you were to spend the rest of your life with your urinary condition the way it is now, how would you feel about that? 3-Mixed   Urine Leakage (Incontinence) 1-Mild (A few drops a day, no pad use)       Objective     Physical Exam:   Vital Signs:   Vitals:    10/16/24 1351   Weight: 97.3 kg (214 lb 8 oz)   Height: 180.3 cm (70.98\")     Body mass index is 29.93 kg/m².     Physical Exam  Vitals and nursing note reviewed.   Constitutional:       General: He is awake. He is not in acute distress.     Appearance: Normal appearance. He is well-developed.   HENT:      Head: Normocephalic and atraumatic.      Right Ear: External ear normal.      Left Ear: External ear normal.      Nose: Nose normal.   Eyes:      Conjunctiva/sclera: Conjunctivae normal.   Pulmonary:      Effort: Pulmonary effort is normal.   Abdominal:      General: There is no distension.      Palpations: Abdomen is soft. There is no mass.      Tenderness: There is no abdominal tenderness. There is no right CVA tenderness, left CVA tenderness, guarding or rebound.      Hernia: No hernia is present. There is no hernia in the left inguinal area or right inguinal area.   Genitourinary:     Pubic Area: No rash.       Rectum: No mass or tenderness. Normal anal tone.   Musculoskeletal:      Cervical back: Normal range of motion.   Lymphadenopathy:      Lower Body: No right inguinal adenopathy. No left inguinal adenopathy.   Skin:     General: Skin is warm.   Neurological:      " General: No focal deficit present.      Mental Status: He is alert and oriented to person, place, and time.   Psychiatric:         Behavior: Behavior normal. Behavior is cooperative.         Labs:   Lab Results   Component Value Date    PSA 3.100 03/29/2023    PSA 3.930 10/27/2022    PSA 1.79 09/21/2021       Brief Urine Lab Results  (Last result in the past 365 days)        Color   Clarity   Blood   Leuk Est   Nitrite   Protein   CREAT   Urine HCG        04/08/24 1604 Yellow   Clear   Negative   Negative   Negative   Negative                   Urine Culture          12/19/2023    16:36 1/11/2024    13:52   Urine Culture   Urine Culture No growth  No growth         Lab Results   Component Value Date    GLUCOSE 113 (H) 01/11/2024    CALCIUM 9.3 01/11/2024     01/11/2024    K 4.5 01/11/2024    CO2 21.0 (L) 01/11/2024     (H) 01/11/2024    BUN 24 (H) 01/11/2024    CREATININE 1.05 01/11/2024    EGFRIFAFRI >60 01/13/2022    EGFRIFNONA >60 01/13/2022    BCR 22.9 01/11/2024    ANIONGAP 8.0 01/11/2024       Lab Results   Component Value Date    WBC 5.37 01/11/2024    HGB 12.7 (L) 01/11/2024    HCT 38.1 01/11/2024    MCV 88.2 01/11/2024     01/11/2024       Images:   No Images in the past 120 days found..    Measures:   Tobacco:   Ivan Serrano  reports that he has never smoked. He has never been exposed to tobacco smoke. He has never used smokeless tobacco.     Urine Incontinence: Patient reports that he is not currently experiencing any symptoms of urinary incontinence.         Assessment / Plan      Assessment:  Mr. Serrano is a 69 y.o. male who presented today with lower urinary tract symptoms.  He is not ready to move forward with TURP yet.  However, he would like to consider TURP after the holidays.   WE will continue his medications for now.  I will see him back in January.       Diagnoses and all orders for this visit:    1. Benign localized prostatic hyperplasia with lower urinary tract  symptoms (LUTS) (Primary)  -     PSA DIAGNOSTIC; Future        Patient Education:       Follow Up:   Return in about 3 months (around 1/16/2025) for Recheck.    I spent approximately 30 minutes providing clinical care for this patient; including review of patient's chart and provider documentation, face to face time spent with patient in examination room (obtaining history, performing physical exam, discussing diagnosis and management options), placing orders, and completing patient documentation.     Susanne Koch MD  [facility] [place]

## 2025-01-07 RX ORDER — CARVEDILOL 12.5 MG/1
12.5 TABLET ORAL 2 TIMES DAILY
Qty: 180 TABLET | Refills: 3 | Status: SHIPPED | OUTPATIENT
Start: 2025-01-07

## 2025-01-22 ENCOUNTER — OFFICE VISIT (OUTPATIENT)
Dept: UROLOGY | Facility: CLINIC | Age: 70
End: 2025-01-22
Payer: MEDICARE

## 2025-01-22 DIAGNOSIS — R31.9 HEMATURIA, UNSPECIFIED TYPE: ICD-10-CM

## 2025-01-22 DIAGNOSIS — N40.1 BENIGN LOCALIZED PROSTATIC HYPERPLASIA WITH LOWER URINARY TRACT SYMPTOMS (LUTS): Primary | ICD-10-CM

## 2025-01-22 NOTE — PROGRESS NOTES
Follow Up Office Visit      Patient Name: Ivan Serrano  : 1955   MRN: [id number]     Chief Complaint:    Chief Complaint   Patient presents with    Benign localized prostatic hyperplasia with lower urinary t       Referring Provider: No ref. provider found    History of Present Illness: Ivan Serrano is a 69 y.o. male who presents today for follow up of LUTS.  He reports he has had significant urgency and frequency for some time.  He was previously treated with Urolift in 2019 and has done some better.  However, he is still complaining of weak stream and obstructive symptoms as wlel.  He is on myrbetriq, finasteride, and tamsulosin.   He reports he is ready to move forward with TURP.     He reports no incontinence but does have some post void dribbling.      Subjective      Review of System:   Review of Systems   I have reviewed the ROS documented by my clinical staff, I have updated appropriately and I agree. Susanne Koch MD    I have reviewed and the following portions of the patient's history were updated as appropriate: past family history, past medical history, past social history, past surgical history and problem list.    Past Medical History:   Past Medical History:   Diagnosis Date    Anemia     Benign localized prostatic hyperplasia with lower urinary tract symptoms (LUTS) 2023    BPH (benign prostatic hyperplasia)     Chronic prostatitis     Clotting disorder     Coronary artery disease     Elevated PSA     Erectile dysfunction     GERD (gastroesophageal reflux disease)     Hypothyroidism     Nonischemic cardiomyopathy     KEVIN (obstructive sleep apnea)     WEARS DENTAL DEVICE    Platelet disorder     Urgency of urination     Urinary incontinence     Urinary tract infection        Past Surgical History:  Past Surgical History:   Procedure Laterality Date    ACHILLES TENDON REPAIR      APPENDECTOMY      CARDIAC CATHETERIZATION Left 10/20/2022    Procedure: Left Heart Cath;   Surgeon: Wili Joshua MD;  Location: [facility] CATH INVASIVE LOCATION;  Service: Cardiovascular;  Laterality: Left;    CHOLECYSTECTOMY      COLONOSCOPY      CYSTOSCOPY      INTERSTIM PERC TEST N/A 06/22/2023    Procedure: PERIPHERAL NERVE EVALUATION FOR OVERACTIV BLADDER;  Surgeon: Susanne Koch MD;  Location: [facility] OR;  Service: Urology;  Laterality: N/A;    SEPTOPLASTY      TONSILLECTOMY         Family History:   family history includes Heart failure in his father; No Known Problems in his mother.   Otherwise pertinent FHx was reviewed and not pertinent to current issue.    Social History:    reports that he has never smoked. He has never been exposed to tobacco smoke. He has never used smokeless tobacco. He reports that he does not drink alcohol and does not use drugs.    Medications:     Current Outpatient Medications:     carvedilol (COREG) 12.5 MG tablet, TAKE 1 TABLET TWICE A DAY, Disp: 180 tablet, Rfl: 3    finasteride (PROSCAR) 5 MG tablet, TAKE 1 TABLET DAILY, Disp: 90 tablet, Rfl: 3    levothyroxine (SYNTHROID, LEVOTHROID) 175 MCG tablet, Take 1 tablet by mouth Daily., Disp: , Rfl:     Myrbetriq 50 MG tablet sustained-release 24 hour 24 hr tablet, TAKE 1 TABLET DAILY, Disp: 90 tablet, Rfl: 3    sacubitril-valsartan (Entresto) 24-26 MG tablet, Take 1 tablet by mouth 2 (Two) Times a Day., Disp: 180 tablet, Rfl: 3    SUMAtriptan (IMITREX) 100 MG tablet, As Needed., Disp: , Rfl:     tamsulosin (FLOMAX) 0.4 MG capsule 24 hr capsule, TAKE 1 CAPSULE DAILY, Disp: 90 capsule, Rfl: 3    topiramate (TOPAMAX) 50 MG tablet, 100 mg am 50 pm, Disp: , Rfl:     Allergies:   No Known Allergies    IPSS Questionnaire (AUA-7):  Over the past month…    1)  Incomplete Emptying  How often have you had a sensation of not emptying your bladder?  2 - Less than half the time   2)  Frequency  How often have you had to urinate less than every two hours? 4 - More than half the time   3)  Intermittency  How often have you found you  stopped and started again several times when you urinated?  2 - Less than half the time   4) Urgency  How often have you found it difficult to postpone urination?  4 - More than half the time   5) Weak Stream  How often have you had a weak urinary stream?  2 - Less than half the time   6) Straining  How often have you had to push or strain to begin urination?  2 - Less than half the time   7) Nocturia  How many times did you typically get up at night to urinate?  1 - 1 time   Total Score:  17   The International Prostate Symptom Score (IPSS) is used to screen, diagnose, track symptoms of benign prostatic hyperplasia (BPH).    0-7 pts (Mild Symptoms)  / 8-19 pts (Moderate) / 20-35 (Severe)    Quality of life due to urinary symptoms:  If you were to spend the rest of your life with your urinary condition the way it is now, how would you feel about that? 4-Mostly Dissatisfied   Urine Leakage (Incontinence) 2-Mild (More than a few drops a day)     Post void residual bladder scan:   0 mL     Objective     Physical Exam:   Vital Signs: There were no vitals filed for this visit.  There is no height or weight on file to calculate BMI.     Physical Exam  Vitals and nursing note reviewed.   Constitutional:       General: He is awake. He is not in acute distress.     Appearance: Normal appearance. He is well-developed.   HENT:      Head: Normocephalic and atraumatic.      Right Ear: External ear normal.      Left Ear: External ear normal.      Nose: Nose normal.   Eyes:      Conjunctiva/sclera: Conjunctivae normal.   Pulmonary:      Effort: Pulmonary effort is normal.   Abdominal:      General: There is no distension.      Palpations: Abdomen is soft. There is no mass.      Tenderness: There is no abdominal tenderness. There is no right CVA tenderness, left CVA tenderness, guarding or rebound.      Hernia: No hernia is present. There is no hernia in the left inguinal area or right inguinal area.   Genitourinary:     Pubic  Area: No rash.       Penis: Normal.       Testes: Normal.      Prostate: Normal.      Rectum: Normal. No mass or tenderness. Normal anal tone.   Musculoskeletal:      Cervical back: Normal range of motion.   Lymphadenopathy:      Lower Body: No right inguinal adenopathy. No left inguinal adenopathy.   Skin:     General: Skin is warm.   Neurological:      General: No focal deficit present.      Mental Status: He is alert and oriented to person, place, and time.   Psychiatric:         Behavior: Behavior normal. Behavior is cooperative.         Labs:   Brief Urine Lab Results  (Last result in the past 365 days)        Color   Clarity   Blood   Leuk Est   Nitrite   Protein   CREAT   Urine HCG        04/08/24 1604 Yellow   Clear   Negative   Negative   Negative   Negative                        Lab Results   Component Value Date    GLUCOSE 113 (H) 01/11/2024    CALCIUM 9.3 01/11/2024     01/11/2024    K 4.5 01/11/2024    CO2 21.0 (L) 01/11/2024     (H) 01/11/2024    BUN 24 (H) 01/11/2024    CREATININE 1.05 01/11/2024    EGFRIFAFRI >60 01/13/2022    EGFRIFNONA >60 01/13/2022    BCR 22.9 01/11/2024    ANIONGAP 8.0 01/11/2024       Lab Results   Component Value Date    WBC 5.37 01/11/2024    HGB 12.7 (L) 01/11/2024    HCT 38.1 01/11/2024    MCV 88.2 01/11/2024     01/11/2024       Lab Results   Component Value Date    PSA 2.380 10/16/2024    PSA 3.100 03/29/2023    PSA 3.930 10/27/2022       Images:   No Images in the past 120 days found..    Measures:   Tobacco:   Ivan Serrano  reports that he has never smoked. He has never been exposed to tobacco smoke. He has never used smokeless tobacco.     Urine Incontinence: Patient reports that he is not currently experiencing any symptoms of urinary incontinence.         Assessment / Plan      Assessment/Plan:   69 y.o. male who presented today for follow up of LUTS.  He reports he is now ready to move forward with surgery.  I discussed his options  including Aquablation but he is reluctant given his history of clotting disorder.  He will require Amicar or TXA prior to any procedure.   We will schedule him for TURP.      Diagnoses and all orders for this visit:    1. Benign localized prostatic hyperplasia with lower urinary tract symptoms (LUTS) (Primary)  -     Case Request; Standing  -     CBC (No Diff); Future  -     Basic Metabolic Panel; Future  -     Protime-INR; Future  -     Urinalysis With Culture If Indicated -; Future  -     ECG 12 Lead; Future  -     ceFAZolin (ANCEF) 2,000 mg in sodium chloride 0.9 % 100 mL IVPB  -     Case Request    2. Hematuria, unspecified type  -     CBC (No Diff); Future  -     Protime-INR; Future    Other orders  -     Outpatient In A Bed; Standing  -     Follow Anesthesia Guidelines / Protocol; Future  -     Follow Anesthesia Guidelines / Protocol; Standing  -     Verify NPO Status; Standing  -     Verify / Perform Chlorhexidine Skin Prep; Standing  -     Provide NPO Instructions to Patient; Future  -     Chlorhexidine Skin Prep; Future  -     Place Sequential Compression Device; Standing  -     Maintain Sequential Compression Device; Standing         Follow Up:   Return in about 3 months (around 4/22/2025).    I spent approximately 45 minutes providing clinical care for this patient; including review of patient's chart and provider documentation, face to face time spent with patient in examination room (obtaining history, performing physical exam, discussing diagnosis and management options), placing orders, and completing patient documentation.     Susanne Koch MD  [facility] [place]

## 2025-02-04 ENCOUNTER — PRE-ADMISSION TESTING (OUTPATIENT)
Dept: PREADMISSION TESTING | Facility: HOSPITAL | Age: 70
End: 2025-02-04
Payer: MEDICARE

## 2025-02-04 VITALS — BODY MASS INDEX: 32.22 KG/M2 | WEIGHT: 225.09 LBS | HEIGHT: 70 IN

## 2025-02-04 DIAGNOSIS — N40.1 BENIGN LOCALIZED PROSTATIC HYPERPLASIA WITH LOWER URINARY TRACT SYMPTOMS (LUTS): ICD-10-CM

## 2025-02-04 DIAGNOSIS — R31.9 HEMATURIA, UNSPECIFIED TYPE: ICD-10-CM

## 2025-02-04 LAB
ANION GAP SERPL CALCULATED.3IONS-SCNC: 9 MMOL/L (ref 5–15)
BILIRUB UR QL STRIP: NEGATIVE
BUN SERPL-MCNC: 16 MG/DL (ref 8–23)
BUN/CREAT SERPL: 15.7 (ref 7–25)
CALCIUM SPEC-SCNC: 9.2 MG/DL (ref 8.6–10.5)
CHLORIDE SERPL-SCNC: 111 MMOL/L (ref 98–107)
CLARITY UR: CLEAR
CO2 SERPL-SCNC: 23 MMOL/L (ref 22–29)
COLOR UR: YELLOW
CREAT SERPL-MCNC: 1.02 MG/DL (ref 0.76–1.27)
DEPRECATED RDW RBC AUTO: 38.6 FL (ref 37–54)
EGFRCR SERPLBLD CKD-EPI 2021: 78.6 ML/MIN/1.73
ERYTHROCYTE [DISTWIDTH] IN BLOOD BY AUTOMATED COUNT: 12.6 % (ref 12.3–15.4)
GLUCOSE SERPL-MCNC: 99 MG/DL (ref 65–99)
GLUCOSE UR STRIP-MCNC: NEGATIVE MG/DL
HCT VFR BLD AUTO: 37.5 % (ref 37.5–51)
HGB BLD-MCNC: 12.7 G/DL (ref 13–17.7)
HGB UR QL STRIP.AUTO: NEGATIVE
INR PPP: 1.11 (ref 0.89–1.12)
KETONES UR QL STRIP: NEGATIVE
LEUKOCYTE ESTERASE UR QL STRIP.AUTO: NEGATIVE
MCH RBC QN AUTO: 28.7 PG (ref 26.6–33)
MCHC RBC AUTO-ENTMCNC: 33.9 G/DL (ref 31.5–35.7)
MCV RBC AUTO: 84.7 FL (ref 79–97)
NITRITE UR QL STRIP: NEGATIVE
PH UR STRIP.AUTO: 7 [PH] (ref 5–8)
PLATELET # BLD AUTO: 159 10*3/MM3 (ref 140–450)
PMV BLD AUTO: 9.7 FL (ref 6–12)
POTASSIUM SERPL-SCNC: 3.9 MMOL/L (ref 3.5–5.2)
PROT UR QL STRIP: NEGATIVE
PROTHROMBIN TIME: 14.5 SECONDS (ref 12.2–14.5)
QT INTERVAL: 370 MS
QTC INTERVAL: 387 MS
RBC # BLD AUTO: 4.43 10*6/MM3 (ref 4.14–5.8)
SODIUM SERPL-SCNC: 143 MMOL/L (ref 136–145)
SP GR UR STRIP: <=1.005 (ref 1–1.03)
UROBILINOGEN UR QL STRIP: NORMAL
WBC NRBC COR # BLD AUTO: 5.11 10*3/MM3 (ref 3.4–10.8)

## 2025-02-04 PROCEDURE — 81003 URINALYSIS AUTO W/O SCOPE: CPT

## 2025-02-04 PROCEDURE — 36415 COLL VENOUS BLD VENIPUNCTURE: CPT

## 2025-02-04 PROCEDURE — 80048 BASIC METABOLIC PNL TOTAL CA: CPT

## 2025-02-04 PROCEDURE — 85610 PROTHROMBIN TIME: CPT

## 2025-02-04 PROCEDURE — 93005 ELECTROCARDIOGRAM TRACING: CPT

## 2025-02-04 PROCEDURE — 85027 COMPLETE CBC AUTOMATED: CPT

## 2025-02-04 NOTE — PAT
The following information and instructions were given:    Do not eat, drink, smoke or chew gum after midnight the night before surgery. This includes no mints.  Take all routine, prescribed medications including heart and blood pressure medicines with a sip of water unless otherwise instructed by your physician.   Do NOT take diabetic medication unless instructed by your physician.      DO NOT shave for two days before your procedure.  Do not wear makeup.      DO NOT wear fingernail polish (gel/regular) and/or acrylic/artificial nails on the day of surgery. If you had a recent manicure and would rather not remove polish or artificial nails, the minimum requirement is that the polish/artificial nails must be removed from the middle finger on each hand.      If you are having surgery/procedure on an upper extremity, fingernail polish/artificial fingernails must be removed for surgery.  NO EXCEPTIONS.      If you are having surgery/procedure on a lower extremity, toenail polish on both extremities must be removed for surgery.  NO EXCEPTIONS.    Remove all jewelry (advise to go to jeweler if unable to remove).  Jewelry, especially rings, can no longer be taped for surgery.    Leave anything you consider valuable at home.    Leave your suitcase in the car until after your surgery if you are staying overnight.    Bring the following with you the day of your procedure (when applicable):       -Picture ID and insurance cards       -Co-pay/deductible required by insurance       -Medications in the original bottles or a detailed list (name, dosage, frequency of medications) including all over-the-counter medications if not brought to PAT       -Copy of advance directive, living will or power of  documents if not brought to PAT       -CPAP or BIPAP mask and tubing (do not bring machine)       -Skin prep instruction(s) sheet       -PAT Pass    Educational handout or binder (joint replacements) related to procedure given  to patient.  Educational handout also includes general information related to the recovery that mentions signs and symptoms of infection and when to call the doctor.    When applicable, an ERAS handout was given to patient.    Respirex use and pneumonia prevention education provided in Pre Admission Testing general education video.    Information related to infection and hand hygiene mentioned in Pre Admission Testing general education video. Patient instructed to call their doctor if any of the following symptoms are noted during recovery:  Fever of 100.4 F or higher, incision that is warm or has increasing bleeding, redness or drainage.    DVT Prevention instructions given in general education video presentation during Pre Admission Testing appointment that stress the importance of ambulation to improve blood circulation.  Also encouraged patient to perform foot exercises when in bed and application of a sequential device may be applied to lower extremities to improve circulation.      Please apply Chlorhexidine wipes to surgical area (if instructed) the night before procedure and the AM of procedure and document date/time of applications on skin prep instruction sheet.          Per Anesthesia Request, patient instructed not to take their ACE/ARB medications on the AM of surgery.

## 2025-02-06 ENCOUNTER — TELEPHONE (OUTPATIENT)
Dept: UROLOGY | Facility: CLINIC | Age: 70
End: 2025-02-06
Payer: MEDICARE

## 2025-02-06 NOTE — TELEPHONE ENCOUNTER
PT called having doubts about TURP scheduled on 2/11. PT states he has a clotting disorder and goggled that the Greenlight procedure has less complications with bleeding. He states he was never made aware of the Greenlight and asked to be referred to Dr. Finney since you are not listed as doing Greenlights online. Can we put pt on schedule Friday to discuss?

## 2025-02-07 ENCOUNTER — OFFICE VISIT (OUTPATIENT)
Dept: UROLOGY | Facility: CLINIC | Age: 70
End: 2025-02-07
Payer: MEDICARE

## 2025-02-07 VITALS — BODY MASS INDEX: 32.19 KG/M2 | WEIGHT: 224.87 LBS | HEIGHT: 70 IN

## 2025-02-07 DIAGNOSIS — N40.1 BENIGN LOCALIZED PROSTATIC HYPERPLASIA WITH LOWER URINARY TRACT SYMPTOMS (LUTS): Primary | ICD-10-CM

## 2025-02-07 NOTE — PROGRESS NOTES
Follow Up Office Visit      Patient Name: Ivan Serrano  : 1955   MRN: [id number]     Chief Complaint:    Chief Complaint   Patient presents with    Benign localized prostatic hyperplasia with lower urinary t       Referring Provider: No ref. provider found    History of Present Illness: Ivan Serrano is a 69 y.o. male who presents today for follow up of lower urinary tract symptoms.  He continues to have significant urgency and frequency as well as weak stream and obstructive symptoms.  He was treated with UroLift in 2019 and was somewhat better but then had return of his symptoms.  He has been on Myrbetriq, finasteride, and tamsulosin.  He is scheduled for bipolar TURP on Tuesday.  However, he had further questions.    Subjective      Review of System:   Review of Systems   I have reviewed the ROS documented by my clinical staff, I have updated appropriately and I agree. Susanne Koch MD    I have reviewed and the following portions of the patient's history were updated as appropriate: past family history, past medical history, past social history, past surgical history and problem list.    Past Medical History:   Past Medical History:   Diagnosis Date    Anemia     Benign localized prostatic hyperplasia with lower urinary tract symptoms (LUTS) 2023    BPH (benign prostatic hyperplasia)     Chronic prostatitis     Clotting disorder     Coronary artery disease     Elevated PSA     Erectile dysfunction     GERD (gastroesophageal reflux disease)     Hypothyroidism     Nonischemic cardiomyopathy     KEVIN (obstructive sleep apnea)     WEARS DENTAL DEVICE    Platelet disorder     Urgency of urination     Urinary incontinence     Urinary tract infection        Past Surgical History:  Past Surgical History:   Procedure Laterality Date    ACHILLES TENDON REPAIR      APPENDECTOMY      CARDIAC CATHETERIZATION Left 10/20/2022    Procedure: Left Heart Cath;  Surgeon: Wili Joshua MD;  Location:  " [facility];  Service: Cardiovascular;  Laterality: Left;    CHOLECYSTECTOMY      COLONOSCOPY      CYSTOSCOPY      INTERSTIM PERC TEST N/A 06/22/2023    Procedure: PERIPHERAL NERVE EVALUATION FOR OVERACTIV BLADDER;  Surgeon: Susanne Koch MD;  Location: [facility] OR;  Service: Urology;  Laterality: N/A;    SEPTOPLASTY      TONSILLECTOMY         Family History:   family history includes Heart failure in his father; No Known Problems in his mother.   Otherwise pertinent FHx was reviewed and not pertinent to current issue.    Social History:    reports that he has never smoked. He has never been exposed to tobacco smoke. He has never used smokeless tobacco. He reports that he does not drink alcohol and does not use drugs.    Medications:     Current Outpatient Medications:     carvedilol (COREG) 12.5 MG tablet, TAKE 1 TABLET TWICE A DAY, Disp: 180 tablet, Rfl: 3    finasteride (PROSCAR) 5 MG tablet, TAKE 1 TABLET DAILY, Disp: 90 tablet, Rfl: 3    levothyroxine (SYNTHROID, LEVOTHROID) 175 MCG tablet, Take 1 tablet by mouth Daily., Disp: , Rfl:     Myrbetriq 50 MG tablet sustained-release 24 hour 24 hr tablet, TAKE 1 TABLET DAILY, Disp: 90 tablet, Rfl: 3    sacubitril-valsartan (Entresto) 24-26 MG tablet, Take 1 tablet by mouth 2 (Two) Times a Day., Disp: 180 tablet, Rfl: 3    SUMAtriptan (IMITREX) 100 MG tablet, Take 1 tablet by mouth 1 (One) Time As Needed for Migraine., Disp: , Rfl:     tamsulosin (FLOMAX) 0.4 MG capsule 24 hr capsule, TAKE 1 CAPSULE DAILY, Disp: 90 capsule, Rfl: 3    topiramate (TOPAMAX) 50 MG tablet, 100 mg am 50 pm, Disp: , Rfl:     Allergies:   No Known Allergies      Objective     Physical Exam:   Vital Signs:   Vitals:    02/07/25 1109   Weight: 102 kg (224 lb 13.9 oz)   Height: 177.8 cm (70\")     Body mass index is 32.27 kg/m².     Physical Exam  Vitals and nursing note reviewed.   Constitutional:       General: He is awake. He is not in acute distress.     Appearance: Normal " appearance. He is well-developed.   HENT:      Head: Normocephalic and atraumatic.      Right Ear: External ear normal.      Left Ear: External ear normal.      Nose: Nose normal.   Eyes:      Conjunctiva/sclera: Conjunctivae normal.   Pulmonary:      Effort: Pulmonary effort is normal.   Abdominal:      General: There is no distension.      Palpations: Abdomen is soft. There is no mass.      Tenderness: There is no abdominal tenderness. There is no right CVA tenderness, left CVA tenderness, guarding or rebound.      Hernia: No hernia is present. There is no hernia in the left inguinal area or right inguinal area.   Genitourinary:     Pubic Area: No rash.       Testes: Normal.      Rectum: No mass or tenderness. Normal anal tone.   Musculoskeletal:      Cervical back: Normal range of motion.   Lymphadenopathy:      Lower Body: No right inguinal adenopathy. No left inguinal adenopathy.   Skin:     General: Skin is warm.   Neurological:      General: No focal deficit present.      Mental Status: He is alert and oriented to person, place, and time.   Psychiatric:         Behavior: Behavior normal. Behavior is cooperative.         Labs:   Brief Urine Lab Results  (Last result in the past 365 days)        Color   Clarity   Blood   Leuk Est   Nitrite   Protein   CREAT   Urine HCG        02/04/25 0945 Yellow   Clear   Negative   Negative   Negative   Negative                        Lab Results   Component Value Date    GLUCOSE 99 02/04/2025    CALCIUM 9.2 02/04/2025     02/04/2025    K 3.9 02/04/2025    CO2 23.0 02/04/2025     (H) 02/04/2025    BUN 16 02/04/2025    CREATININE 1.02 02/04/2025    EGFRIFAFRI >60 01/13/2022    EGFRIFNONA >60 01/13/2022    BCR 15.7 02/04/2025    ANIONGAP 9.0 02/04/2025       Lab Results   Component Value Date    WBC 5.11 02/04/2025    HGB 12.7 (L) 02/04/2025    HCT 37.5 02/04/2025    MCV 84.7 02/04/2025     02/04/2025       Lab Results   Component Value Date    PSA 2.380  10/16/2024    PSA 3.100 03/29/2023    PSA 3.930 10/27/2022       Images:   No Images in the past 120 days found..    Measures:   Tobacco:   Ivan Serrano  reports that he has never smoked. He has never been exposed to tobacco smoke. He has never used smokeless tobacco.     Urine Incontinence: Patient reports that he is not currently experiencing any symptoms of urinary incontinence.         Assessment / Plan      Assessment/Plan:   69 y.o. male who presented today for follow up of lower urinary tract symptoms.  He is scheduled for TURP on Tuesday.  I again discussed his options with him which were aqua ablation, greenlight laser, and bipolar TURP.  Given his history of UroLift I would not recommend greenlight laser.  He understood the rationale for not using greenlight laser.  We discussed aqua ablation and TURP and given his significant bleeding history I would be wary of using aqua ablation.  I again discussed his options and also advised that he could benefit from second opinion if he wishes.  However, he understood his options and has elected to move forward with bipolar TURP.  I do believe this is the best option for him at this time.    Diagnoses and all orders for this visit:    1. Benign localized prostatic hyperplasia with lower urinary tract symptoms (LUTS) (Primary)         Follow Up:   Return for Follow up after surgery.    I spent approximately 30 minutes providing clinical care for this patient; including review of patient's chart and provider documentation, face to face time spent with patient in examination room (obtaining history, performing physical exam, discussing diagnosis and management options), placing orders, and completing patient documentation.     Susanne Koch MD  [facility] [place]

## 2025-02-07 NOTE — H&P (VIEW-ONLY)
Follow Up Office Visit      Patient Name: Ivan Serrano  : 1955   MRN: [id number]     Chief Complaint:    Chief Complaint   Patient presents with    Benign localized prostatic hyperplasia with lower urinary t       Referring Provider: No ref. provider found    History of Present Illness: Ivan Serrano is a 69 y.o. male who presents today for follow up of lower urinary tract symptoms.  He continues to have significant urgency and frequency as well as weak stream and obstructive symptoms.  He was treated with UroLift in 2019 and was somewhat better but then had return of his symptoms.  He has been on Myrbetriq, finasteride, and tamsulosin.  He is scheduled for bipolar TURP on Tuesday.  However, he had further questions.    Subjective      Review of System:   Review of Systems   I have reviewed the ROS documented by my clinical staff, I have updated appropriately and I agree. Susanne Koch MD    I have reviewed and the following portions of the patient's history were updated as appropriate: past family history, past medical history, past social history, past surgical history and problem list.    Past Medical History:   Past Medical History:   Diagnosis Date    Anemia     Benign localized prostatic hyperplasia with lower urinary tract symptoms (LUTS) 2023    BPH (benign prostatic hyperplasia)     Chronic prostatitis     Clotting disorder     Coronary artery disease     Elevated PSA     Erectile dysfunction     GERD (gastroesophageal reflux disease)     Hypothyroidism     Nonischemic cardiomyopathy     KEVIN (obstructive sleep apnea)     WEARS DENTAL DEVICE    Platelet disorder     Urgency of urination     Urinary incontinence     Urinary tract infection        Past Surgical History:  Past Surgical History:   Procedure Laterality Date    ACHILLES TENDON REPAIR      APPENDECTOMY      CARDIAC CATHETERIZATION Left 10/20/2022    Procedure: Left Heart Cath;  Surgeon: Wili Joshua MD;  Location:  " [facility];  Service: Cardiovascular;  Laterality: Left;    CHOLECYSTECTOMY      COLONOSCOPY      CYSTOSCOPY      INTERSTIM PERC TEST N/A 06/22/2023    Procedure: PERIPHERAL NERVE EVALUATION FOR OVERACTIV BLADDER;  Surgeon: Susanne Koch MD;  Location: [facility] OR;  Service: Urology;  Laterality: N/A;    SEPTOPLASTY      TONSILLECTOMY         Family History:   family history includes Heart failure in his father; No Known Problems in his mother.   Otherwise pertinent FHx was reviewed and not pertinent to current issue.    Social History:    reports that he has never smoked. He has never been exposed to tobacco smoke. He has never used smokeless tobacco. He reports that he does not drink alcohol and does not use drugs.    Medications:     Current Outpatient Medications:     carvedilol (COREG) 12.5 MG tablet, TAKE 1 TABLET TWICE A DAY, Disp: 180 tablet, Rfl: 3    finasteride (PROSCAR) 5 MG tablet, TAKE 1 TABLET DAILY, Disp: 90 tablet, Rfl: 3    levothyroxine (SYNTHROID, LEVOTHROID) 175 MCG tablet, Take 1 tablet by mouth Daily., Disp: , Rfl:     Myrbetriq 50 MG tablet sustained-release 24 hour 24 hr tablet, TAKE 1 TABLET DAILY, Disp: 90 tablet, Rfl: 3    sacubitril-valsartan (Entresto) 24-26 MG tablet, Take 1 tablet by mouth 2 (Two) Times a Day., Disp: 180 tablet, Rfl: 3    SUMAtriptan (IMITREX) 100 MG tablet, Take 1 tablet by mouth 1 (One) Time As Needed for Migraine., Disp: , Rfl:     tamsulosin (FLOMAX) 0.4 MG capsule 24 hr capsule, TAKE 1 CAPSULE DAILY, Disp: 90 capsule, Rfl: 3    topiramate (TOPAMAX) 50 MG tablet, 100 mg am 50 pm, Disp: , Rfl:     Allergies:   No Known Allergies      Objective     Physical Exam:   Vital Signs:   Vitals:    02/07/25 1109   Weight: 102 kg (224 lb 13.9 oz)   Height: 177.8 cm (70\")     Body mass index is 32.27 kg/m².     Physical Exam  Vitals and nursing note reviewed.   Constitutional:       General: He is awake. He is not in acute distress.     Appearance: Normal " appearance. He is well-developed.   HENT:      Head: Normocephalic and atraumatic.      Right Ear: External ear normal.      Left Ear: External ear normal.      Nose: Nose normal.   Eyes:      Conjunctiva/sclera: Conjunctivae normal.   Pulmonary:      Effort: Pulmonary effort is normal.   Abdominal:      General: There is no distension.      Palpations: Abdomen is soft. There is no mass.      Tenderness: There is no abdominal tenderness. There is no right CVA tenderness, left CVA tenderness, guarding or rebound.      Hernia: No hernia is present. There is no hernia in the left inguinal area or right inguinal area.   Genitourinary:     Pubic Area: No rash.       Testes: Normal.      Rectum: No mass or tenderness. Normal anal tone.   Musculoskeletal:      Cervical back: Normal range of motion.   Lymphadenopathy:      Lower Body: No right inguinal adenopathy. No left inguinal adenopathy.   Skin:     General: Skin is warm.   Neurological:      General: No focal deficit present.      Mental Status: He is alert and oriented to person, place, and time.   Psychiatric:         Behavior: Behavior normal. Behavior is cooperative.         Labs:   Brief Urine Lab Results  (Last result in the past 365 days)        Color   Clarity   Blood   Leuk Est   Nitrite   Protein   CREAT   Urine HCG        02/04/25 0945 Yellow   Clear   Negative   Negative   Negative   Negative                        Lab Results   Component Value Date    GLUCOSE 99 02/04/2025    CALCIUM 9.2 02/04/2025     02/04/2025    K 3.9 02/04/2025    CO2 23.0 02/04/2025     (H) 02/04/2025    BUN 16 02/04/2025    CREATININE 1.02 02/04/2025    EGFRIFAFRI >60 01/13/2022    EGFRIFNONA >60 01/13/2022    BCR 15.7 02/04/2025    ANIONGAP 9.0 02/04/2025       Lab Results   Component Value Date    WBC 5.11 02/04/2025    HGB 12.7 (L) 02/04/2025    HCT 37.5 02/04/2025    MCV 84.7 02/04/2025     02/04/2025       Lab Results   Component Value Date    PSA 2.380  10/16/2024    PSA 3.100 03/29/2023    PSA 3.930 10/27/2022       Images:   No Images in the past 120 days found..    Measures:   Tobacco:   Ivan Serrano  reports that he has never smoked. He has never been exposed to tobacco smoke. He has never used smokeless tobacco.     Urine Incontinence: Patient reports that he is not currently experiencing any symptoms of urinary incontinence.         Assessment / Plan      Assessment/Plan:   69 y.o. male who presented today for follow up of lower urinary tract symptoms.  He is scheduled for TURP on Tuesday.  I again discussed his options with him which were aqua ablation, greenlight laser, and bipolar TURP.  Given his history of UroLift I would not recommend greenlight laser.  He understood the rationale for not using greenlight laser.  We discussed aqua ablation and TURP and given his significant bleeding history I would be wary of using aqua ablation.  I again discussed his options and also advised that he could benefit from second opinion if he wishes.  However, he understood his options and has elected to move forward with bipolar TURP.  I do believe this is the best option for him at this time.    Diagnoses and all orders for this visit:    1. Benign localized prostatic hyperplasia with lower urinary tract symptoms (LUTS) (Primary)         Follow Up:   Return for Follow up after surgery.    I spent approximately 30 minutes providing clinical care for this patient; including review of patient's chart and provider documentation, face to face time spent with patient in examination room (obtaining history, performing physical exam, discussing diagnosis and management options), placing orders, and completing patient documentation.     Susanne Koch MD  [facility] [place] [place]

## 2025-02-10 ENCOUNTER — ANESTHESIA EVENT (OUTPATIENT)
Dept: PERIOP | Facility: HOSPITAL | Age: 70
End: 2025-02-10
Payer: MEDICARE

## 2025-02-10 RX ORDER — FAMOTIDINE 10 MG/ML
20 INJECTION, SOLUTION INTRAVENOUS ONCE
Status: CANCELLED | OUTPATIENT
Start: 2025-02-10 | End: 2025-02-10

## 2025-02-11 ENCOUNTER — ANESTHESIA (OUTPATIENT)
Dept: PERIOP | Facility: HOSPITAL | Age: 70
End: 2025-02-11
Payer: MEDICARE

## 2025-02-11 ENCOUNTER — HOSPITAL ENCOUNTER (OUTPATIENT)
Facility: HOSPITAL | Age: 70
Discharge: HOME | End: 2025-02-12
Attending: UROLOGY | Admitting: UROLOGY
Payer: MEDICARE

## 2025-02-11 DIAGNOSIS — N40.1 BENIGN LOCALIZED PROSTATIC HYPERPLASIA WITH LOWER URINARY TRACT SYMPTOMS (LUTS): ICD-10-CM

## 2025-02-11 PROBLEM — I10 HYPERTENSION: Status: ACTIVE | Noted: 2025-02-11

## 2025-02-11 PROBLEM — I25.10 CAD (CORONARY ARTERY DISEASE): Status: ACTIVE | Noted: 2025-02-11

## 2025-02-11 PROBLEM — Z90.79 S/P TURP: Status: ACTIVE | Noted: 2025-02-11

## 2025-02-11 LAB
ANION GAP SERPL CALCULATED.3IONS-SCNC: 8 MMOL/L (ref 5–15)
BUN SERPL-MCNC: 16 MG/DL (ref 8–23)
BUN/CREAT SERPL: 15 (ref 7–25)
CALCIUM SPEC-SCNC: 8.6 MG/DL (ref 8.6–10.5)
CHLORIDE SERPL-SCNC: 111 MMOL/L (ref 98–107)
CO2 SERPL-SCNC: 22 MMOL/L (ref 22–29)
CREAT SERPL-MCNC: 1.07 MG/DL (ref 0.76–1.27)
DEPRECATED RDW RBC AUTO: 39.3 FL (ref 37–54)
EGFRCR SERPLBLD CKD-EPI 2021: 74.2 ML/MIN/1.73
ERYTHROCYTE [DISTWIDTH] IN BLOOD BY AUTOMATED COUNT: 12.6 % (ref 12.3–15.4)
GLUCOSE SERPL-MCNC: 153 MG/DL (ref 65–99)
HCT VFR BLD AUTO: 39.1 % (ref 37.5–51)
HGB BLD-MCNC: 13.2 G/DL (ref 13–17.7)
MCH RBC QN AUTO: 28.9 PG (ref 26.6–33)
MCHC RBC AUTO-ENTMCNC: 33.8 G/DL (ref 31.5–35.7)
MCV RBC AUTO: 85.6 FL (ref 79–97)
PLATELET # BLD AUTO: 156 10*3/MM3 (ref 140–450)
PMV BLD AUTO: 10.4 FL (ref 6–12)
POTASSIUM SERPL-SCNC: 4.5 MMOL/L (ref 3.5–5.2)
RBC # BLD AUTO: 4.57 10*6/MM3 (ref 4.14–5.8)
SODIUM SERPL-SCNC: 141 MMOL/L (ref 136–145)
WBC NRBC COR # BLD AUTO: 6.23 10*3/MM3 (ref 3.4–10.8)

## 2025-02-11 PROCEDURE — 25010000002 CEFAZOLIN PER 500 MG: Performed by: UROLOGY

## 2025-02-11 PROCEDURE — 25010000002 DEXAMETHASONE PER 1 MG: Performed by: STUDENT IN AN ORGANIZED HEALTH CARE EDUCATION/TRAINING PROGRAM

## 2025-02-11 PROCEDURE — 25010000002 LIDOCAINE PF 1% 1 % SOLUTION: Performed by: STUDENT IN AN ORGANIZED HEALTH CARE EDUCATION/TRAINING PROGRAM

## 2025-02-11 PROCEDURE — A9270 NON-COVERED ITEM OR SERVICE: HCPCS | Performed by: UROLOGY

## 2025-02-11 PROCEDURE — 63710000001 FAMOTIDINE 20 MG TABLET: Performed by: STUDENT IN AN ORGANIZED HEALTH CARE EDUCATION/TRAINING PROGRAM

## 2025-02-11 PROCEDURE — 52601 PROSTATECTOMY (TURP): CPT | Performed by: UROLOGY

## 2025-02-11 PROCEDURE — 63710000001 CARVEDILOL 12.5 MG TABLET: Performed by: UROLOGY

## 2025-02-11 PROCEDURE — 88305 TISSUE EXAM BY PATHOLOGIST: CPT | Performed by: UROLOGY

## 2025-02-11 PROCEDURE — 63710000001 TAMSULOSIN 0.4 MG CAPSULE: Performed by: UROLOGY

## 2025-02-11 PROCEDURE — 87086 URINE CULTURE/COLONY COUNT: CPT | Performed by: UROLOGY

## 2025-02-11 PROCEDURE — A9270 NON-COVERED ITEM OR SERVICE: HCPCS | Performed by: STUDENT IN AN ORGANIZED HEALTH CARE EDUCATION/TRAINING PROGRAM

## 2025-02-11 PROCEDURE — 63710000001 FINASTERIDE 5 MG TABLET: Performed by: UROLOGY

## 2025-02-11 PROCEDURE — 25010000002 FENTANYL CITRATE (PF) 50 MCG/ML SOLUTION

## 2025-02-11 PROCEDURE — 63710000001 PHENAZOPYRIDINE 100 MG TABLET: Performed by: STUDENT IN AN ORGANIZED HEALTH CARE EDUCATION/TRAINING PROGRAM

## 2025-02-11 PROCEDURE — 63710000001 OXYCODONE-ACETAMINOPHEN 10-325 MG TABLET: Performed by: UROLOGY

## 2025-02-11 PROCEDURE — 25010000002 PROPOFOL 10 MG/ML EMULSION: Performed by: STUDENT IN AN ORGANIZED HEALTH CARE EDUCATION/TRAINING PROGRAM

## 2025-02-11 PROCEDURE — 25010000002 HYDROMORPHONE 1 MG/ML SOLUTION

## 2025-02-11 PROCEDURE — 25810000003 LACTATED RINGERS PER 1000 ML: Performed by: STUDENT IN AN ORGANIZED HEALTH CARE EDUCATION/TRAINING PROGRAM

## 2025-02-11 PROCEDURE — 80048 BASIC METABOLIC PNL TOTAL CA: CPT | Performed by: UROLOGY

## 2025-02-11 PROCEDURE — 85027 COMPLETE CBC AUTOMATED: CPT | Performed by: UROLOGY

## 2025-02-11 PROCEDURE — 25010000002 FENTANYL CITRATE (PF) 100 MCG/2ML SOLUTION: Performed by: STUDENT IN AN ORGANIZED HEALTH CARE EDUCATION/TRAINING PROGRAM

## 2025-02-11 PROCEDURE — 25010000002 ONDANSETRON PER 1 MG: Performed by: STUDENT IN AN ORGANIZED HEALTH CARE EDUCATION/TRAINING PROGRAM

## 2025-02-11 RX ORDER — ONDANSETRON 2 MG/ML
4 INJECTION INTRAMUSCULAR; INTRAVENOUS EVERY 6 HOURS PRN
Status: DISCONTINUED | OUTPATIENT
Start: 2025-02-11 | End: 2025-02-11 | Stop reason: SDUPTHER

## 2025-02-11 RX ORDER — SUMATRIPTAN 50 MG/1
100 TABLET, FILM COATED ORAL ONCE AS NEEDED
Status: DISCONTINUED | OUTPATIENT
Start: 2025-02-11 | End: 2025-02-12 | Stop reason: HOSPADM

## 2025-02-11 RX ORDER — MAGNESIUM HYDROXIDE 1200 MG/15ML
LIQUID ORAL AS NEEDED
Status: DISCONTINUED | OUTPATIENT
Start: 2025-02-11 | End: 2025-02-11 | Stop reason: HOSPADM

## 2025-02-11 RX ORDER — HYDROMORPHONE HYDROCHLORIDE 1 MG/ML
0.5 INJECTION, SOLUTION INTRAMUSCULAR; INTRAVENOUS; SUBCUTANEOUS
Status: DISCONTINUED | OUTPATIENT
Start: 2025-02-11 | End: 2025-02-11 | Stop reason: SDUPTHER

## 2025-02-11 RX ORDER — FENTANYL CITRATE 50 UG/ML
50 INJECTION, SOLUTION INTRAMUSCULAR; INTRAVENOUS
Status: DISCONTINUED | OUTPATIENT
Start: 2025-02-11 | End: 2025-02-11 | Stop reason: HOSPADM

## 2025-02-11 RX ORDER — LIDOCAINE HYDROCHLORIDE 10 MG/ML
0.5 INJECTION, SOLUTION EPIDURAL; INFILTRATION; INTRACAUDAL; PERINEURAL ONCE AS NEEDED
Status: COMPLETED | OUTPATIENT
Start: 2025-02-11 | End: 2025-02-11

## 2025-02-11 RX ORDER — DOCUSATE SODIUM 100 MG/1
100 CAPSULE, LIQUID FILLED ORAL 2 TIMES DAILY PRN
Status: DISCONTINUED | OUTPATIENT
Start: 2025-02-11 | End: 2025-02-12 | Stop reason: HOSPADM

## 2025-02-11 RX ORDER — OXYCODONE AND ACETAMINOPHEN 10; 325 MG/1; MG/1
1 TABLET ORAL EVERY 4 HOURS PRN
Status: DISCONTINUED | OUTPATIENT
Start: 2025-02-11 | End: 2025-02-12 | Stop reason: HOSPADM

## 2025-02-11 RX ORDER — LEVOTHYROXINE SODIUM 175 UG/1
175 TABLET ORAL
Status: DISCONTINUED | OUTPATIENT
Start: 2025-02-12 | End: 2025-02-12 | Stop reason: HOSPADM

## 2025-02-11 RX ORDER — PROPOFOL 10 MG/ML
VIAL (ML) INTRAVENOUS AS NEEDED
Status: DISCONTINUED | OUTPATIENT
Start: 2025-02-11 | End: 2025-02-11 | Stop reason: SURG

## 2025-02-11 RX ORDER — MIRABEGRON 50 MG/1
50 TABLET, FILM COATED, EXTENDED RELEASE ORAL DAILY
Status: DISCONTINUED | OUTPATIENT
Start: 2025-02-12 | End: 2025-02-12 | Stop reason: HOSPADM

## 2025-02-11 RX ORDER — ONDANSETRON 2 MG/ML
INJECTION INTRAMUSCULAR; INTRAVENOUS AS NEEDED
Status: DISCONTINUED | OUTPATIENT
Start: 2025-02-11 | End: 2025-02-11 | Stop reason: SURG

## 2025-02-11 RX ORDER — SODIUM CHLORIDE 0.9 % (FLUSH) 0.9 %
10 SYRINGE (ML) INJECTION AS NEEDED
Status: DISCONTINUED | OUTPATIENT
Start: 2025-02-11 | End: 2025-02-11 | Stop reason: HOSPADM

## 2025-02-11 RX ORDER — CARVEDILOL 12.5 MG/1
12.5 TABLET ORAL 2 TIMES DAILY WITH MEALS
Status: DISCONTINUED | OUTPATIENT
Start: 2025-02-11 | End: 2025-02-12 | Stop reason: HOSPADM

## 2025-02-11 RX ORDER — KETOROLAC TROMETHAMINE 15 MG/ML
15 INJECTION, SOLUTION INTRAMUSCULAR; INTRAVENOUS EVERY 6 HOURS PRN
Status: DISCONTINUED | OUTPATIENT
Start: 2025-02-11 | End: 2025-02-12 | Stop reason: HOSPADM

## 2025-02-11 RX ORDER — FINASTERIDE 5 MG/1
5 TABLET, FILM COATED ORAL DAILY
Status: DISCONTINUED | OUTPATIENT
Start: 2025-02-11 | End: 2025-02-12 | Stop reason: HOSPADM

## 2025-02-11 RX ORDER — PHENAZOPYRIDINE HYDROCHLORIDE 100 MG/1
200 TABLET, FILM COATED ORAL
Status: DISCONTINUED | OUTPATIENT
Start: 2025-02-11 | End: 2025-02-12 | Stop reason: HOSPADM

## 2025-02-11 RX ORDER — ONDANSETRON 2 MG/ML
4 INJECTION INTRAMUSCULAR; INTRAVENOUS EVERY 6 HOURS PRN
Status: DISCONTINUED | OUTPATIENT
Start: 2025-02-11 | End: 2025-02-12 | Stop reason: HOSPADM

## 2025-02-11 RX ORDER — LABETALOL HYDROCHLORIDE 5 MG/ML
10 INJECTION, SOLUTION INTRAVENOUS EVERY 4 HOURS PRN
Status: DISCONTINUED | OUTPATIENT
Start: 2025-02-11 | End: 2025-02-12 | Stop reason: HOSPADM

## 2025-02-11 RX ORDER — FENTANYL CITRATE 50 UG/ML
INJECTION, SOLUTION INTRAMUSCULAR; INTRAVENOUS
Status: COMPLETED
Start: 2025-02-11 | End: 2025-02-11

## 2025-02-11 RX ORDER — FENTANYL CITRATE 50 UG/ML
50 INJECTION, SOLUTION INTRAMUSCULAR; INTRAVENOUS
Status: DISCONTINUED | OUTPATIENT
Start: 2025-02-11 | End: 2025-02-11 | Stop reason: SDUPTHER

## 2025-02-11 RX ORDER — TRANEXAMIC ACID 10 MG/ML
1000 INJECTION, SOLUTION INTRAVENOUS ONCE
Status: DISCONTINUED | OUTPATIENT
Start: 2025-02-11 | End: 2025-02-11 | Stop reason: HOSPADM

## 2025-02-11 RX ORDER — HYDROMORPHONE HYDROCHLORIDE 1 MG/ML
0.5 INJECTION, SOLUTION INTRAMUSCULAR; INTRAVENOUS; SUBCUTANEOUS
Status: DISCONTINUED | OUTPATIENT
Start: 2025-02-11 | End: 2025-02-11 | Stop reason: HOSPADM

## 2025-02-11 RX ORDER — MIDAZOLAM HYDROCHLORIDE 1 MG/ML
0.5 INJECTION, SOLUTION INTRAMUSCULAR; INTRAVENOUS
Status: DISCONTINUED | OUTPATIENT
Start: 2025-02-11 | End: 2025-02-11 | Stop reason: HOSPADM

## 2025-02-11 RX ORDER — ACETAMINOPHEN 325 MG/1
650 TABLET ORAL EVERY 4 HOURS PRN
Status: DISCONTINUED | OUTPATIENT
Start: 2025-02-11 | End: 2025-02-12 | Stop reason: HOSPADM

## 2025-02-11 RX ORDER — HEPARIN SODIUM 5000 [USP'U]/ML
5000 INJECTION, SOLUTION INTRAVENOUS; SUBCUTANEOUS EVERY 12 HOURS SCHEDULED
Status: DISCONTINUED | OUTPATIENT
Start: 2025-02-12 | End: 2025-02-12 | Stop reason: HOSPADM

## 2025-02-11 RX ORDER — LIDOCAINE HYDROCHLORIDE 10 MG/ML
INJECTION, SOLUTION EPIDURAL; INFILTRATION; INTRACAUDAL; PERINEURAL AS NEEDED
Status: DISCONTINUED | OUTPATIENT
Start: 2025-02-11 | End: 2025-02-11 | Stop reason: SURG

## 2025-02-11 RX ORDER — LIDOCAINE HYDROCHLORIDE 20 MG/ML
JELLY TOPICAL AS NEEDED
Status: DISCONTINUED | OUTPATIENT
Start: 2025-02-11 | End: 2025-02-11 | Stop reason: HOSPADM

## 2025-02-11 RX ORDER — BUPIVACAINE HCL/0.9 % NACL/PF 0.125 %
PLASTIC BAG, INJECTION (ML) EPIDURAL AS NEEDED
Status: DISCONTINUED | OUTPATIENT
Start: 2025-02-11 | End: 2025-02-11 | Stop reason: SURG

## 2025-02-11 RX ORDER — TAMSULOSIN HYDROCHLORIDE 0.4 MG/1
0.4 CAPSULE ORAL DAILY
Status: DISCONTINUED | OUTPATIENT
Start: 2025-02-11 | End: 2025-02-12 | Stop reason: HOSPADM

## 2025-02-11 RX ORDER — NALOXONE HCL 0.4 MG/ML
0.1 VIAL (ML) INJECTION
Status: DISCONTINUED | OUTPATIENT
Start: 2025-02-11 | End: 2025-02-12 | Stop reason: HOSPADM

## 2025-02-11 RX ORDER — FENTANYL CITRATE 50 UG/ML
INJECTION, SOLUTION INTRAMUSCULAR; INTRAVENOUS AS NEEDED
Status: DISCONTINUED | OUTPATIENT
Start: 2025-02-11 | End: 2025-02-11 | Stop reason: SURG

## 2025-02-11 RX ORDER — SODIUM CHLORIDE 9 MG/ML
100 INJECTION, SOLUTION INTRAVENOUS CONTINUOUS
Status: DISCONTINUED | OUTPATIENT
Start: 2025-02-11 | End: 2025-02-12 | Stop reason: HOSPADM

## 2025-02-11 RX ORDER — SODIUM CHLORIDE 0.9 % (FLUSH) 0.9 %
10 SYRINGE (ML) INJECTION EVERY 12 HOURS SCHEDULED
Status: DISCONTINUED | OUTPATIENT
Start: 2025-02-11 | End: 2025-02-11 | Stop reason: HOSPADM

## 2025-02-11 RX ORDER — SODIUM CHLORIDE, SODIUM LACTATE, POTASSIUM CHLORIDE, CALCIUM CHLORIDE 600; 310; 30; 20 MG/100ML; MG/100ML; MG/100ML; MG/100ML
9 INJECTION, SOLUTION INTRAVENOUS CONTINUOUS
Status: ACTIVE | OUTPATIENT
Start: 2025-02-12 | End: 2025-02-12

## 2025-02-11 RX ORDER — TRANEXAMIC ACID 10 MG/ML
1000 INJECTION, SOLUTION INTRAVENOUS ONCE
Status: COMPLETED | OUTPATIENT
Start: 2025-02-11 | End: 2025-02-11

## 2025-02-11 RX ORDER — SODIUM CHLORIDE 0.9 % (FLUSH) 0.9 %
10 SYRINGE (ML) INJECTION AS NEEDED
Status: DISCONTINUED | OUTPATIENT
Start: 2025-02-11 | End: 2025-02-12 | Stop reason: HOSPADM

## 2025-02-11 RX ORDER — SODIUM CHLORIDE 0.9 % (FLUSH) 0.9 %
10 SYRINGE (ML) INJECTION EVERY 12 HOURS SCHEDULED
Status: DISCONTINUED | OUTPATIENT
Start: 2025-02-11 | End: 2025-02-12 | Stop reason: HOSPADM

## 2025-02-11 RX ORDER — ONDANSETRON 4 MG/1
4 TABLET, ORALLY DISINTEGRATING ORAL EVERY 6 HOURS PRN
Status: DISCONTINUED | OUTPATIENT
Start: 2025-02-11 | End: 2025-02-12 | Stop reason: HOSPADM

## 2025-02-11 RX ORDER — ONDANSETRON 2 MG/ML
4 INJECTION INTRAMUSCULAR; INTRAVENOUS ONCE AS NEEDED
Status: DISCONTINUED | OUTPATIENT
Start: 2025-02-11 | End: 2025-02-11 | Stop reason: HOSPADM

## 2025-02-11 RX ORDER — ONDANSETRON 2 MG/ML
4 INJECTION INTRAMUSCULAR; INTRAVENOUS ONCE AS NEEDED
Status: DISCONTINUED | OUTPATIENT
Start: 2025-02-11 | End: 2025-02-11 | Stop reason: SDUPTHER

## 2025-02-11 RX ORDER — EPHEDRINE SULFATE 50 MG/ML
INJECTION INTRAVENOUS AS NEEDED
Status: DISCONTINUED | OUTPATIENT
Start: 2025-02-11 | End: 2025-02-11 | Stop reason: SURG

## 2025-02-11 RX ORDER — FAMOTIDINE 20 MG/1
20 TABLET, FILM COATED ORAL ONCE
Status: COMPLETED | OUTPATIENT
Start: 2025-02-11 | End: 2025-02-11

## 2025-02-11 RX ORDER — OXYCODONE AND ACETAMINOPHEN 5; 325 MG/1; MG/1
1 TABLET ORAL EVERY 4 HOURS PRN
Status: DISCONTINUED | OUTPATIENT
Start: 2025-02-11 | End: 2025-02-12 | Stop reason: HOSPADM

## 2025-02-11 RX ORDER — SODIUM CHLORIDE 9 MG/ML
40 INJECTION, SOLUTION INTRAVENOUS AS NEEDED
Status: DISCONTINUED | OUTPATIENT
Start: 2025-02-11 | End: 2025-02-12 | Stop reason: HOSPADM

## 2025-02-11 RX ORDER — DEXAMETHASONE SODIUM PHOSPHATE 4 MG/ML
INJECTION, SOLUTION INTRA-ARTICULAR; INTRALESIONAL; INTRAMUSCULAR; INTRAVENOUS; SOFT TISSUE AS NEEDED
Status: DISCONTINUED | OUTPATIENT
Start: 2025-02-11 | End: 2025-02-11 | Stop reason: SURG

## 2025-02-11 RX ADMIN — PHENAZOPYRIDINE HYDROCHLORIDE 200 MG: 100 TABLET ORAL at 12:32

## 2025-02-11 RX ADMIN — HYDROMORPHONE HYDROCHLORIDE 0.5 MG: 1 INJECTION, SOLUTION INTRAMUSCULAR; INTRAVENOUS; SUBCUTANEOUS at 09:47

## 2025-02-11 RX ADMIN — FINASTERIDE 5 MG: 5 TABLET, FILM COATED ORAL at 15:50

## 2025-02-11 RX ADMIN — FENTANYL CITRATE 100 MCG: 50 INJECTION, SOLUTION INTRAMUSCULAR; INTRAVENOUS at 07:40

## 2025-02-11 RX ADMIN — PROPOFOL 150 MG: 10 INJECTION, EMULSION INTRAVENOUS at 07:40

## 2025-02-11 RX ADMIN — FENTANYL CITRATE 100 MCG: 50 INJECTION, SOLUTION INTRAMUSCULAR; INTRAVENOUS at 08:23

## 2025-02-11 RX ADMIN — EPHEDRINE SULFATE 10 MG: 50 INJECTION INTRAVENOUS at 07:51

## 2025-02-11 RX ADMIN — EPHEDRINE SULFATE 10 MG: 50 INJECTION INTRAVENOUS at 07:45

## 2025-02-11 RX ADMIN — TRANEXAMIC ACID 1000 MG: 10 INJECTION, SOLUTION INTRAVENOUS at 07:47

## 2025-02-11 RX ADMIN — ONDANSETRON 4 MG: 2 INJECTION INTRAMUSCULAR; INTRAVENOUS at 07:45

## 2025-02-11 RX ADMIN — FENTANYL CITRATE 50 MCG: 50 INJECTION, SOLUTION INTRAMUSCULAR; INTRAVENOUS at 10:05

## 2025-02-11 RX ADMIN — DEXAMETHASONE SODIUM PHOSPHATE 4 MG: 4 INJECTION INTRA-ARTICULAR; INTRALESIONAL; INTRAMUSCULAR; INTRAVENOUS; SOFT TISSUE at 07:45

## 2025-02-11 RX ADMIN — TAMSULOSIN HYDROCHLORIDE 0.4 MG: 0.4 CAPSULE ORAL at 15:50

## 2025-02-11 RX ADMIN — FENTANYL CITRATE 50 MCG: 50 INJECTION, SOLUTION INTRAMUSCULAR; INTRAVENOUS at 09:27

## 2025-02-11 RX ADMIN — LIDOCAINE HYDROCHLORIDE 50 MG: 10 INJECTION, SOLUTION EPIDURAL; INFILTRATION; INTRACAUDAL; PERINEURAL at 07:40

## 2025-02-11 RX ADMIN — LIDOCAINE HYDROCHLORIDE 0.5 ML: 10 INJECTION, SOLUTION EPIDURAL; INFILTRATION; INTRACAUDAL; PERINEURAL at 06:46

## 2025-02-11 RX ADMIN — CARVEDILOL 12.5 MG: 12.5 TABLET, FILM COATED ORAL at 17:12

## 2025-02-11 RX ADMIN — Medication 100 MCG: at 07:53

## 2025-02-11 RX ADMIN — Medication 100 MCG: at 08:27

## 2025-02-11 RX ADMIN — FAMOTIDINE 20 MG: 20 TABLET, FILM COATED ORAL at 06:49

## 2025-02-11 RX ADMIN — EPHEDRINE SULFATE 5 MG: 50 INJECTION INTRAVENOUS at 08:02

## 2025-02-11 RX ADMIN — Medication 100 MCG: at 07:40

## 2025-02-11 RX ADMIN — Medication 100 MCG: at 07:49

## 2025-02-11 RX ADMIN — OXYCODONE HYDROCHLORIDE AND ACETAMINOPHEN 1 TABLET: 10; 325 TABLET ORAL at 18:56

## 2025-02-11 RX ADMIN — Medication 10 ML: at 20:10

## 2025-02-11 RX ADMIN — TRANEXAMIC ACID 1000 MG: 10 INJECTION, SOLUTION INTRAVENOUS at 08:29

## 2025-02-11 RX ADMIN — HYDROMORPHONE HYDROCHLORIDE 0.5 MG: 1 INJECTION, SOLUTION INTRAMUSCULAR; INTRAVENOUS; SUBCUTANEOUS at 11:59

## 2025-02-11 RX ADMIN — SODIUM CHLORIDE 2000 MG: 900 INJECTION INTRAVENOUS at 07:43

## 2025-02-11 RX ADMIN — Medication 100 MCG: at 08:32

## 2025-02-11 RX ADMIN — Medication 100 MCG: at 08:00

## 2025-02-11 RX ADMIN — PHENAZOPYRIDINE HYDROCHLORIDE 200 MG: 100 TABLET ORAL at 17:13

## 2025-02-11 RX ADMIN — SODIUM CHLORIDE 2000 MG: 900 INJECTION INTRAVENOUS at 15:50

## 2025-02-11 RX ADMIN — SODIUM CHLORIDE, SODIUM LACTATE, POTASSIUM CHLORIDE, CALCIUM CHLORIDE 9 ML/HR: 20; 30; 600; 310 INJECTION, SOLUTION INTRAVENOUS at 06:46

## 2025-02-11 NOTE — ANESTHESIA PREPROCEDURE EVALUATION
Anesthesia Evaluation     Patient summary reviewed and Nursing notes reviewed   no history of anesthetic complications:   NPO Solid Status: > 8 hours  NPO Liquid Status: > 2 hours           Airway   Mallampati: II  TM distance: >3 FB  Neck ROM: full  No difficulty expected  Dental - normal exam     Pulmonary - normal exam    breath sounds clear to auscultation  (+) ,sleep apnea (wears dental device)  Cardiovascular - normal exam    ECG reviewed  Rhythm: regular  Rate: normal    (+) CAD, CHF (On entresto; dilated cardiomyopathy; however, echo from 01/2023 show recovery to normal systolic and diastolic function after entresto therapy)     ROS comment: EKG 2/4/25:   HR 66  Normal sinus rhythm  Normal ECG    ECHO 01/2023: EF 55-60%, diastolic function normal, normal RV function, mild AI, mild MR, mild TR (normal RVSP), mild to moderate PI, the main PA is mildly dilated (3.5 cm)    [facility] 10/2022: No significant CAD, LVEF 30%      Neuro/Psych  (+) headaches (Migraines)  GI/Hepatic/Renal/Endo    (+) obesity, GERD, thyroid problem hypothyroidism    Musculoskeletal     Abdominal    Substance History - negative use     OB/GYN          Other - negative ROS                     Anesthesia Plan    ASA 3     general     intravenous induction     Anesthetic plan, risks, benefits, and alternatives have been provided, discussed and informed consent has been obtained with: patient.    Plan discussed with CRNA.    CODE STATUS:

## 2025-02-11 NOTE — ANESTHESIA POSTPROCEDURE EVALUATION
Patient: Ivan Serrano    Procedure Summary       Date: 02/11/25 Room / Location:  [facility]    Anesthesia Start: 0736 Anesthesia Stop: 0847    Procedure: CYSTOSCOPY BIPOLAR TRANSURETHRAL RESECTION OF PROSTATE Diagnosis:       Benign localized prostatic hyperplasia with lower urinary tract symptoms (LUTS)      (Benign localized prostatic hyperplasia with lower urinary tract symptoms (LUTS) [N40.1])    Surgeons: Susanne Koch MD Provider: Jesus Ely MD    Anesthesia Type: general ASA Status: 3            Anesthesia Type: general    Vitals  Vitals Value Taken Time   BP     Temp     Pulse     Resp     SpO2 97 % 02/11/25 0847           Post Anesthesia Care and Evaluation    Patient location during evaluation: PACU  Patient participation: complete - patient participated  Level of consciousness: awake and alert  Pain management: adequate    Airway patency: patent  Anesthetic complications: No anesthetic complications  PONV Status: none  Cardiovascular status: hemodynamically stable and acceptable  Respiratory status: nonlabored ventilation, acceptable and nasal cannula  Hydration status: acceptable

## 2025-02-11 NOTE — ANESTHESIA POSTPROCEDURE EVALUATION
Patient: Ivan Serrano    Procedure Summary       Date: 02/11/25 Room / Location:  [facility]    Anesthesia Start: 0736 Anesthesia Stop: 0847    Procedure: CYSTOSCOPY BIPOLAR TRANSURETHRAL RESECTION OF PROSTATE Diagnosis:       Benign localized prostatic hyperplasia with lower urinary tract symptoms (LUTS)      (Benign localized prostatic hyperplasia with lower urinary tract symptoms (LUTS) [N40.1])    Surgeons: Susanne Koch MD Provider: Jesus Ely MD    Anesthesia Type: general ASA Status: 3            Anesthesia Type: general    Vitals  Vitals Value Taken Time   /60 02/11/25 0915   Temp 97 °F (36.1 °C) 02/11/25 0846   Pulse 71 02/11/25 0926   Resp 16 02/11/25 0900   SpO2 94 % 02/11/25 0926   Vitals shown include unfiled device data.        Post Anesthesia Care and Evaluation    Patient location during evaluation: PACU  Patient participation: complete - patient participated  Level of consciousness: awake and alert  Pain management: adequate    Airway patency: patent  Anesthetic complications: No anesthetic complications  PONV Status: none  Cardiovascular status: hemodynamically stable and acceptable  Respiratory status: nonlabored ventilation, acceptable and nasal cannula  Hydration status: acceptable

## 2025-02-11 NOTE — INTERVAL H&P NOTE
"Pre-Op H&P (See Recent Office Note Attached for Full H&P)    History and physical note from office reviewed and updated with the following, otherwise there are no changes in H&P:      Review of Systems:  General ROS:  no fever, chills, rashes.  No change since last office visit.  No recent sick exposure  Cardiovascular ROS: no chest pain or dyspnea on exertion  Respiratory ROS: no cough, shortness of breath, or wheezing    Meds:    No current facility-administered medications on file prior to encounter.     Current Outpatient Medications on File Prior to Encounter   Medication Sig Dispense Refill    carvedilol (COREG) 12.5 MG tablet TAKE 1 TABLET TWICE A  tablet 3    finasteride (PROSCAR) 5 MG tablet TAKE 1 TABLET DAILY 90 tablet 3    levothyroxine (SYNTHROID, LEVOTHROID) 175 MCG tablet Take 1 tablet by mouth Daily.      Myrbetriq 50 MG tablet sustained-release 24 hour 24 hr tablet TAKE 1 TABLET DAILY 90 tablet 3    sacubitril-valsartan (Entresto) 24-26 MG tablet Take 1 tablet by mouth 2 (Two) Times a Day. 180 tablet 3    tamsulosin (FLOMAX) 0.4 MG capsule 24 hr capsule TAKE 1 CAPSULE DAILY 90 capsule 3    topiramate (TOPAMAX) 50 MG tablet 100 mg am 50 pm      SUMAtriptan (IMITREX) 100 MG tablet Take 1 tablet by mouth 1 (One) Time As Needed for Migraine.         Vital Signs:  /75 (BP Location: Right arm, Patient Position: Sitting)   Pulse 60   Temp 97.8 °F (36.6 °C) (Temporal)   Resp 18   Ht 177.8 cm (70\")   Wt 102 kg (224 lb)   SpO2 99%   BMI 32.14 kg/m²     Physical Exam:    CV:  S1S2 regular rate and rhythm, no murmur               Resp:  Clear to auscultation; respirations regular, even and unlabored    Results Review:     Lab Results   Component Value Date    WBC 5.11 02/04/2025    HGB 12.7 (L) 02/04/2025    HCT 37.5 02/04/2025    MCV 84.7 02/04/2025     02/04/2025    NEUTROABS 4.43 01/13/2022    GLUCOSE 99 02/04/2025    BUN 16 02/04/2025    CREATININE 1.02 02/04/2025    EGFRIFNONA " >60 01/13/2022    EGFRIFAFRI >60 01/13/2022     02/04/2025    K 3.9 02/04/2025     (H) 02/04/2025    CO2 23.0 02/04/2025    MG 2.0 01/13/2022    CALCIUM 9.2 02/04/2025    ALBUMIN 4.00 10/20/2022    AST 13 10/20/2022    ALT 16 10/20/2022    BILITOT 0.5 10/20/2022    PTT 32.8 01/11/2024    INR 1.11 02/04/2025   I reviewed the patient's new clinical results.    Cancer Staging (if applicable)  Cancer Patient: __ yes __no __unknown; If yes, clinical stage T:__ N:__M:__, stage group or __N/A    Assessment/Plan:   Benign localized prostatic hyperplasia with lower urinary tract symptoms (LUTS) / CYSTOSCOPY BIPOLAR TRANSURETHRAL RESECTION OF PROSTATE        NATALIE Dumont   2/11/2025   06:50 EST

## 2025-02-11 NOTE — ANESTHESIA PROCEDURE NOTES
Airway  Urgency: elective    Date/Time: 2/11/2025 7:42 AM  Airway not difficult    General Information and Staff    Patient location during procedure: OR  SRNA: Angel Gayle SRNA  Indications and Patient Condition  Indications for airway management: airway protection    Preoxygenated: yes  Mask difficulty assessment: 1 - vent by mask    Final Airway Details  Final airway type: supraglottic airway      Successful airway: I-gel and LMA  Size 5     Number of attempts at approach: 1  Assessment: lips, teeth, and gum same as pre-op    Additional Comments  LMA placed without difficulty, ventilation with assist, equal breath sounds and symmetric chest rise and fall

## 2025-02-11 NOTE — H&P
Admission      Patient Name: Ivan Serrano  MRN: [id number]  : 1955  DOS: 2025    Attending: Susanne Koch MD    Primary Care Provider: Kira Daugherty APRN      Patient Care Team:  Kira Daugherty APRN as PCP - General (Family Medicine)    Chief complaint:  Difficulty with urination    Subjective   Patient is a pleasant 69 y.o. male presented for scheduled surgery by . He underwent transurethral resection of prostate under general anesthesia.  He tolerated surgery well and was admitted for further medical management.  He reports difficulty with urination worsening over the past couple years. He continues to have significant urgency and frequency as well as weak stream and obstructive symptoms. He was treated with UroLift in 2019 and was somewhat better but then had return of his symptoms. He has been on Myrbetriq, finasteride, and tamsulosin.     When seen in PACU he is doing well.  He denies pain.  He denies nausea, shortness of breath or chest pain.  No history of DVT or PE.    Allergies:  No Known Allergies    Meds:  Medications Prior to Admission   Medication Sig Dispense Refill Last Dose/Taking    carvedilol (COREG) 12.5 MG tablet TAKE 1 TABLET TWICE A  tablet 3 2025 at  4:30 AM    finasteride (PROSCAR) 5 MG tablet TAKE 1 TABLET DAILY 90 tablet 3 2/10/2025    levothyroxine (SYNTHROID, LEVOTHROID) 175 MCG tablet Take 1 tablet by mouth Daily.   2025 at  4:30 AM    Myrbetriq 50 MG tablet sustained-release 24 hour 24 hr tablet TAKE 1 TABLET DAILY 90 tablet 3 2025 at  4:30 AM    sacubitril-valsartan (Entresto) 24-26 MG tablet Take 1 tablet by mouth 2 (Two) Times a Day. 180 tablet 3 2/10/2025    tamsulosin (FLOMAX) 0.4 MG capsule 24 hr capsule TAKE 1 CAPSULE DAILY 90 capsule 3 2/10/2025    topiramate (TOPAMAX) 50 MG tablet 100 mg am 50 pm   2025 at  4:30 AM    SUMAtriptan (IMITREX) 100 MG tablet Take 1 tablet by mouth 1 (One) Time As Needed for  "Migraine.   More than a month         History:   Past Medical History:   Diagnosis Date    Anemia     Benign localized prostatic hyperplasia with lower urinary tract symptoms (LUTS) 08/02/2023    BPH (benign prostatic hyperplasia)     Chronic prostatitis     Clotting disorder     Coronary artery disease     Elevated PSA     Erectile dysfunction     GERD (gastroesophageal reflux disease)     Hypertension 2/11/2025    Hypothyroidism     Nonischemic cardiomyopathy     KEVIN (obstructive sleep apnea)     WEARS DENTAL DEVICE    Platelet disorder     Urgency of urination     Urinary incontinence     Urinary tract infection      Past Surgical History:   Procedure Laterality Date    ACHILLES TENDON REPAIR      APPENDECTOMY      CARDIAC CATHETERIZATION Left 10/20/2022    Procedure: Left Heart Cath;  Surgeon: Wili Joshua MD;  Location: [facility] CATH INVASIVE LOCATION;  Service: Cardiovascular;  Laterality: Left;    CHOLECYSTECTOMY      COLONOSCOPY      CYSTOSCOPY      INTERSTIM PERC TEST N/A 06/22/2023    Procedure: PERIPHERAL NERVE EVALUATION FOR OVERACTIV BLADDER;  Surgeon: Susanne Koch MD;  Location: [facility] OR;  Service: Urology;  Laterality: N/A;    SEPTOPLASTY      TONSILLECTOMY       Family History   Problem Relation Age of Onset    No Known Problems Mother     Heart failure Father      Social History     Tobacco Use    Smoking status: Never     Passive exposure: Never    Smokeless tobacco: Never   Vaping Use    Vaping status: Never Used   Substance Use Topics    Alcohol use: Never    Drug use: Never   He is  with 3 children.  He is retired  nurse.    Review of Systems  All systems were reviewed and negative except for:  Cardiovascular: positive for  chest pressure / pain, at rest    Vital Signs  /73   Pulse 88   Temp 97.8 °F (36.6 °C)   Resp 18   Ht 177.8 cm (70\")   Wt 102 kg (224 lb)   SpO2 95%   BMI 32.14 kg/m²     Physical Exam:    General Appearance:    Alert, cooperative, in no acute " distress   Head:    Normocephalic, without obvious abnormality, atraumatic   Eyes:            Lids and lashes normal, conjunctivae and sclerae normal, no   icterus, no pallor, corneas clear   Ears:    Ears appear intact with no abnormalities noted   Throat:   No oral lesions, no thrush, oral mucosa moist   Neck:   No adenopathy, supple, trachea midline, no thyromegaly    Lungs:     Clear to auscultation,respirations regular, even and       unlabored. No wheezes or rales.    Heart:    Regular rhythm and normal rate, normal S1 and S2   Abdomen:      Soft, nontender.  Positive bowel sounds   Genitalia:    Deferred.  Arriaga-pink UOP with CBI infusing   Extremities:   Moves all extremities well, no edema, no cyanosis, no redness   Pulses:   Pulses palpable and equal bilaterally   Skin:   No bleeding, bruising or rash   Neurologic:   Cranial nerves 2 - 12 grossly intact,       Lab Results   Component Value Date    HGBA1C 5.5 12/06/2023      Latest Reference Range & Units 02/04/25 09:45   Sodium 136 - 145 mmol/L 143   Potassium 3.5 - 5.2 mmol/L 3.9   Chloride 98 - 107 mmol/L 111 (H)   CO2 22.0 - 29.0 mmol/L 23.0   Anion Gap 5.0 - 15.0 mmol/L 9.0   BUN 8 - 23 mg/dL 16   Creatinine 0.76 - 1.27 mg/dL 1.02   BUN/Creatinine Ratio 7.0 - 25.0  15.7   eGFR >60.0 mL/min/1.73 78.6   Glucose 65 - 99 mg/dL 99   Calcium 8.6 - 10.5 mg/dL 9.2   Protime 12.2 - 14.5 Seconds 14.5   INR 0.89 - 1.12  1.11   WBC 3.40 - 10.80 10*3/mm3 5.11   RBC 4.14 - 5.80 10*6/mm3 4.43   Hemoglobin 13.0 - 17.7 g/dL 12.7 (L)   Hematocrit 37.5 - 51.0 % 37.5   Platelets 140 - 450 10*3/mm3 159   RDW 12.3 - 15.4 % 12.6   MCV 79.0 - 97.0 fL 84.7   MCH 26.6 - 33.0 pg 28.7   MCHC 31.5 - 35.7 g/dL 33.9   MPV 6.0 - 12.0 fL 9.7   (H): Data is abnormally high  (L): Data is abnormally low    Assessment and Plan:     S/P TURP    Benign localized prostatic hyperplasia with lower urinary tract symptoms (LUTS)    Hypertension    CAD (coronary artery disease)      Plan  1.  Ambulation  2. Pain control-prns   3. IS-encourage  4. DVT proph- Mechanical  5. Bowel regimen  6. Resume home medications per Dr. Koch  7. Monitor post-op labs  8. DC planning for home, likely tomorrrow  9. Regular diet    Arriaga with CBI per urology    HTN, CAD  - Continue home coreg  - resume entresto when ok with urology  - Monitor BP   - Holding parameters for BP meds  - Labetalol PRN for SBP>170      Radha Barron, MELANIA  02/11/25  13:10 EST

## 2025-02-11 NOTE — OP NOTE
CYSTOSCOPY TRANSURETHRAL RESECTION OF PROSTATE WITH SALINE  Procedure Note    Ivan Serrano  2/11/2025    Pre-op Diagnosis:   Benign localized prostatic hyperplasia with lower urinary tract symptoms (LUTS) [N40.1]    Post-op Diagnosis:     Post-Op Diagnosis Codes:     * Benign localized prostatic hyperplasia with lower urinary tract symptoms (LUTS) [N40.1]    Procedure/CPT® Codes:  MN TRURL ELECTROSURG RESCJ PROSTATE BLEED COMPLETE [12869]    Procedure(s):  CYSTOSCOPY BIPOLAR TRANSURETHRAL RESECTION OF PROSTATE    Surgeon(s):  Susanne Koch MD Schloegel, Van Ambrose, MD    Anesthesia: see anesthesia record    Staff:   Circulator: Dorina Silva RN  Scrub Person: Nuha Alvarado  Other: Jocelyne Barron    Estimated Blood Loss: minimal  Urine Voided: * No values recorded between 2/11/2025  7:36 AM and 2/11/2025  8:44 AM *    Specimens:                ID Type Source Tests Collected by Time   1 : URINE CULTURE & SENSITIVITY Urine Urinary Bladder URINE CULTURE Susanne Koch MD 2/11/2025 0756   A : PROSTATE CHIPS WITH UROLIFT CLIPS IN SPECIMEN Tissue Prostate TISSUE PATHOLOGY EXAM Susanne Koch MD 2/11/2025 0758         Drains: 22 f 3-way kenney on CBI    Findings:   Cystoscopy with no masses lesions noted within the walls of bladder  Flexible cystoscopy showed no UroLift clips within the bladder  Transurethral resection of prostate performed with wide open prostatic channel multiple clips excised  22 Pashto Kenney catheter placed with clear urine draining with slow CBI    Blood: N/A    Complications: None immediate    Indication for Procedure: Mr. Serrano is a 69-year-old gentleman with a long history of lower urinary tract symptoms.  He underwent UroLift approximately 6 or 7 years ago.  He was doing well for a while then had recurrence of his symptoms along with irritative symptoms.  He underwent workup and found to have regrowth of his prostate.  After long consideration he elected to move forward with  transurethral resection of his prostate given his prostate size.    Description of Procedure: The patient was seen and examined in the preoperative area.  The risks, benefits, and alternatives were explained to the patient and family.  Informed consent was obtained.  The patient was then taken back to the operating theater and placed on the table in a supine position.  Venodyne boots were placed on the bilateral lower extremities.  General anesthesia was induced without any complication.  They were then placed in the dorsal lithotomy position with all pressure points padded and secured.  The patient was prepped and draped in the usual sterile fashion and a timeout was taken.      A 16 Gibraltarian flexible cystoscope was advanced through the urethra and into the bladder.  Cystoscopy performed in systematic fashion.  There were no masses or lesions noted.  The scope was retroflexed in the prostate was examined there were no UroLift tabs visible within the bladder.  A 26 Gibraltarian continuous-flow resectoscope was advanced to the urethra and into the bladder.  Using bipolar electrocautery we began our resection with the left side of the prostate taking this down until capsule is visible.  Multiple UroLift clips were excised and removed from the bladder.  Resection was then completed on the right side as well and the patient had no real median lobe.  Once the channel was wide open all the prostate chips were removed and sent off for specimen including the UroLift metal clips.  The area was inspected for hemostasis which was excellent.  A 22 Gibraltarian hematuria catheter was inserted after placing lidocaine jelly within the bladder.  He was started on slow CBI.  The patient tolerated the procedure well and there were no complications the procedure.  He was taken to PACU in stable next week condition.    Disposition: The patient will be transferred to the floor once PACU criteria is met.  He will be kept on CBI overnight and in the  [facility] for observation.  The intraoperative findings and postoperative plans were discussed with the patient and family.     Susanne Koch MD     Date: 2/11/2025  Time: 08:54 EST

## 2025-02-11 NOTE — NURSING NOTE
While patient was in PACU, RN advised patient not to take home medication Entresto and to wait until arrival to floor to see what medications were ordered there. Against RN advise, patient did take Entresto 24-26mg @ 1330.

## 2025-02-12 VITALS
WEIGHT: 224 LBS | HEIGHT: 70 IN | HEART RATE: 95 BPM | DIASTOLIC BLOOD PRESSURE: 62 MMHG | OXYGEN SATURATION: 100 % | RESPIRATION RATE: 18 BRPM | SYSTOLIC BLOOD PRESSURE: 106 MMHG | TEMPERATURE: 97.9 F | BODY MASS INDEX: 32.07 KG/M2

## 2025-02-12 LAB
ANION GAP SERPL CALCULATED.3IONS-SCNC: 7 MMOL/L (ref 5–15)
BACTERIA SPEC AEROBE CULT: NO GROWTH
BUN SERPL-MCNC: 15 MG/DL (ref 8–23)
BUN/CREAT SERPL: 16.7 (ref 7–25)
CALCIUM SPEC-SCNC: 8.6 MG/DL (ref 8.6–10.5)
CHLORIDE SERPL-SCNC: 114 MMOL/L (ref 98–107)
CO2 SERPL-SCNC: 22 MMOL/L (ref 22–29)
CREAT SERPL-MCNC: 0.9 MG/DL (ref 0.76–1.27)
CYTO UR: NORMAL
DEPRECATED RDW RBC AUTO: 39.7 FL (ref 37–54)
EGFRCR SERPLBLD CKD-EPI 2021: 91.3 ML/MIN/1.73
ERYTHROCYTE [DISTWIDTH] IN BLOOD BY AUTOMATED COUNT: 12.6 % (ref 12.3–15.4)
GLUCOSE SERPL-MCNC: 100 MG/DL (ref 65–99)
HCT VFR BLD AUTO: 37.2 % (ref 37.5–51)
HGB BLD-MCNC: 12.5 G/DL (ref 13–17.7)
LAB AP CASE REPORT: NORMAL
LAB AP CLINICAL INFORMATION: NORMAL
MCH RBC QN AUTO: 28.8 PG (ref 26.6–33)
MCHC RBC AUTO-ENTMCNC: 33.6 G/DL (ref 31.5–35.7)
MCV RBC AUTO: 85.7 FL (ref 79–97)
PATH REPORT.FINAL DX SPEC: NORMAL
PATH REPORT.GROSS SPEC: NORMAL
PLATELET # BLD AUTO: 139 10*3/MM3 (ref 140–450)
PMV BLD AUTO: 10.1 FL (ref 6–12)
POTASSIUM SERPL-SCNC: 3.9 MMOL/L (ref 3.5–5.2)
RBC # BLD AUTO: 4.34 10*6/MM3 (ref 4.14–5.8)
SODIUM SERPL-SCNC: 143 MMOL/L (ref 136–145)
WBC NRBC COR # BLD AUTO: 7.19 10*3/MM3 (ref 3.4–10.8)

## 2025-02-12 PROCEDURE — 80048 BASIC METABOLIC PNL TOTAL CA: CPT | Performed by: UROLOGY

## 2025-02-12 PROCEDURE — 25010000002 CEFAZOLIN PER 500 MG: Performed by: UROLOGY

## 2025-02-12 PROCEDURE — 99024 POSTOP FOLLOW-UP VISIT: CPT | Performed by: NURSE PRACTITIONER

## 2025-02-12 PROCEDURE — 63710000001 TAMSULOSIN 0.4 MG CAPSULE: Performed by: UROLOGY

## 2025-02-12 PROCEDURE — 63710000001 FINASTERIDE 5 MG TABLET: Performed by: UROLOGY

## 2025-02-12 PROCEDURE — 63710000001 PHENAZOPYRIDINE 100 MG TABLET: Performed by: STUDENT IN AN ORGANIZED HEALTH CARE EDUCATION/TRAINING PROGRAM

## 2025-02-12 PROCEDURE — A9270 NON-COVERED ITEM OR SERVICE: HCPCS | Performed by: UROLOGY

## 2025-02-12 PROCEDURE — A9270 NON-COVERED ITEM OR SERVICE: HCPCS | Performed by: STUDENT IN AN ORGANIZED HEALTH CARE EDUCATION/TRAINING PROGRAM

## 2025-02-12 PROCEDURE — 63710000001 LEVOTHYROXINE 175 MCG TABLET: Performed by: UROLOGY

## 2025-02-12 PROCEDURE — 25010000002 HEPARIN (PORCINE) PER 1000 UNITS: Performed by: UROLOGY

## 2025-02-12 PROCEDURE — 25810000003 SODIUM CHLORIDE 0.9 % SOLUTION: Performed by: UROLOGY

## 2025-02-12 PROCEDURE — 85027 COMPLETE CBC AUTOMATED: CPT | Performed by: UROLOGY

## 2025-02-12 PROCEDURE — 63710000001 CARVEDILOL 12.5 MG TABLET: Performed by: UROLOGY

## 2025-02-12 RX ORDER — SACUBITRIL AND VALSARTAN 24; 26 MG/1; MG/1
1 TABLET, FILM COATED ORAL EVERY 12 HOURS SCHEDULED
Status: DISCONTINUED | OUTPATIENT
Start: 2025-02-12 | End: 2025-02-12 | Stop reason: HOSPADM

## 2025-02-12 RX ORDER — TOPIRAMATE SPINKLE 25 MG/1
100 CAPSULE ORAL EVERY MORNING
Status: DISCONTINUED | OUTPATIENT
Start: 2025-02-12 | End: 2025-02-12 | Stop reason: HOSPADM

## 2025-02-12 RX ORDER — TRAMADOL HYDROCHLORIDE 50 MG/1
50 TABLET ORAL EVERY 6 HOURS PRN
Qty: 12 TABLET | Refills: 0 | Status: SHIPPED | OUTPATIENT
Start: 2025-02-12

## 2025-02-12 RX ORDER — PHENAZOPYRIDINE HYDROCHLORIDE 200 MG/1
200 TABLET, FILM COATED ORAL 3 TIMES DAILY PRN
Qty: 20 TABLET | Refills: 0 | Status: SHIPPED | OUTPATIENT
Start: 2025-02-12

## 2025-02-12 RX ORDER — TOPIRAMATE SPINKLE 25 MG/1
50 CAPSULE ORAL NIGHTLY
Status: DISCONTINUED | OUTPATIENT
Start: 2025-02-12 | End: 2025-02-12 | Stop reason: HOSPADM

## 2025-02-12 RX ORDER — TOPIRAMATE SPINKLE 25 MG/1
50 CAPSULE ORAL NIGHTLY
Status: DISCONTINUED | OUTPATIENT
Start: 2025-02-12 | End: 2025-02-12

## 2025-02-12 RX ORDER — DOCUSATE SODIUM 100 MG/1
100 CAPSULE, LIQUID FILLED ORAL 3 TIMES DAILY PRN
Qty: 30 CAPSULE | Refills: 1 | Status: SHIPPED | OUTPATIENT
Start: 2025-02-12

## 2025-02-12 RX ORDER — SULFAMETHOXAZOLE AND TRIMETHOPRIM 800; 160 MG/1; MG/1
1 TABLET ORAL 2 TIMES DAILY
Qty: 10 TABLET | Refills: 0 | Status: SHIPPED | OUTPATIENT
Start: 2025-02-12 | End: 2025-02-17

## 2025-02-12 RX ORDER — OXYBUTYNIN CHLORIDE 5 MG/1
5 TABLET ORAL EVERY 8 HOURS PRN
Qty: 20 TABLET | Refills: 0 | Status: SHIPPED | OUTPATIENT
Start: 2025-02-12 | End: 2025-02-12 | Stop reason: HOSPADM

## 2025-02-12 RX ORDER — SACUBITRIL AND VALSARTAN 24; 26 MG/1; MG/1
1 TABLET, FILM COATED ORAL 2 TIMES DAILY
Qty: 180 TABLET | Refills: 3 | Status: SHIPPED | OUTPATIENT
Start: 2025-02-12

## 2025-02-12 RX ADMIN — HEPARIN SODIUM 5000 UNITS: 5000 INJECTION INTRAVENOUS; SUBCUTANEOUS at 08:45

## 2025-02-12 RX ADMIN — FINASTERIDE 5 MG: 5 TABLET, FILM COATED ORAL at 08:45

## 2025-02-12 RX ADMIN — TAMSULOSIN HYDROCHLORIDE 0.4 MG: 0.4 CAPSULE ORAL at 08:44

## 2025-02-12 RX ADMIN — PHENAZOPYRIDINE HYDROCHLORIDE 200 MG: 100 TABLET ORAL at 08:44

## 2025-02-12 RX ADMIN — CARVEDILOL 12.5 MG: 12.5 TABLET, FILM COATED ORAL at 08:44

## 2025-02-12 RX ADMIN — SODIUM CHLORIDE 2000 MG: 900 INJECTION INTRAVENOUS at 00:35

## 2025-02-12 RX ADMIN — LEVOTHYROXINE SODIUM 175 MCG: 0.17 TABLET ORAL at 05:49

## 2025-02-12 RX ADMIN — SODIUM CHLORIDE 100 ML/HR: 9 INJECTION, SOLUTION INTRAVENOUS at 00:39

## 2025-02-12 NOTE — PROGRESS NOTES
Patient resting in bed. Has ambulated. CBI off since this morning, urine dark orange to kenney catheter. Ok to discharge home with kenney catheter in place. Plan for outpatient follow-up with our office on Friday for voiding trial.  Plan for outpatient follow-up with Dr. Koch in 4 weeks.  Our office will call to schedule.  Patient is understanding and agreeable plan of care.  Nursing to provide leg bag and nighttime bag and provide catheter teaching prior to discharge. D/w Dr. Koch.

## 2025-02-12 NOTE — NURSING NOTE
VSS, A&O. CBI clamped early in shift by Urology. Urine remained without clots, yellowish- orange. Pt denies pain. Watched home kenney cath care video. Changed to leg bag and provided with overnight drainage bag. Pt to follow up with [facility] office on Friday for cath removal/ voiding trial and again in 4 weeks.

## 2025-02-12 NOTE — PLAN OF CARE
Problem: Adult Inpatient Plan of Care  Goal: Plan of Care Review  Outcome: Progressing  Flowsheets  Taken 2/12/2025 0651 by Tan Aldrich, RN  Progress: improving  Outcome Evaluation: VSS. JOHN MAJANO&Ox4. Passing flatus. CBI infusing slow with light pink/orange output. No complaints of pain or nausea. Fluids infusing. Will continue plan of care.  Taken 2/11/2025 0949 by Katia Cardoza, RN  Plan of Care Reviewed With: patient  Goal: Patient-Specific Goal (Individualized)  Outcome: Progressing  Goal: Absence of Hospital-Acquired Illness or Injury  Outcome: Progressing  Goal: Optimal Comfort and Wellbeing  Outcome: Progressing  Goal: Readiness for Transition of Care  Outcome: Progressing   Goal Outcome Evaluation:           Progress: improving  Outcome Evaluation: VSS. JOHN MAJANO&Ox4. Passing flatus. CBI infusing slow with light pink/orange output. No complaints of pain or nausea. Fluids infusing. Will continue plan of care.

## 2025-02-12 NOTE — PROGRESS NOTES
[facility]   Urology Progress Note    Patient Name: Ivan Serrano  : 1955  MRN: [id number]  Primary Care Physician:  Kira Daugherty APRN  Date of admission: 2025    Subjective   Subjective     Chief Complaint: BPH with LUTS    HPI:  Patient resting in bed, minimal pain/mild dysuria. He is ambulating, tolerating diet and passing flatus. AM labs not collected yet. VSS. 3 way kenney cathter with dark orange urine output on slow CBI.     Review of Systems   All systems were reviewed and negative except for: 3 way kenney catheter    Objective   Objective     Vitals:   Temp:  [97 °F (36.1 °C)-98.1 °F (36.7 °C)] 97.8 °F (36.6 °C)  Heart Rate:  [] 69  Resp:  [12-18] 16  BP: ()/(59-82) 95/60  Flow (L/min) (Oxygen Therapy):  [1-2] 1  Physical Exam    Constitutional: Awake in bed, alert   Eyes: PERRLA, sclerae anicteric, no conjunctival injection   HENT: Normocephalic, atraumatic, mucous membranes moist   Neck: Supple, trachea midline   Respiratory: Equal chest rise, non-labored respirations    Cardiovascular: RRR   Gastrointestinal: Soft, nontender, non-distended   Genitourinary: 3 way kenney catheter with dark orange urine output on slow CBI.     Musculoskeletal: No lower extremity edema bilaterally, no clubbing or cyanosis to extremities   Psychiatric: Appropriate affect, cooperative   Neurologic: Oriented x 3,  Cranial Nerves grossly intact, speech clear   Skin: No rashes     Result Review    Result Review:  I have personally reviewed the results from the time of this admission to 2025 08:40 EST and agree with these findings:  []  Laboratory  []  Microbiology  []  Radiology  []  EKG/Telemetry   []  Cardiology/Vascular   []  Pathology  []  Old records  [x]  Other: vital signs, operative report      Assessment & Plan   Assessment / Plan     Brief Patient Summary:  Ivan Serrano is a 69 y.o. male who is POD 1 s/p Cystoscopy, Transurethral resection of prostate with Dr. Koch  for BPH with lower urinary tract symptoms.     Active Hospital Problems:  Active Hospital Problems    Diagnosis     **S/P TURP     Hypertension     CAD (coronary artery disease)     Benign localized prostatic hyperplasia with lower urinary tract symptoms (LUTS)        Plan:   -AM labs pending.  -CBI clamped.  -Likely home around lunch.   D/w Dr. Koch.     VTE Prophylaxis:  Pharmacologic & mechanical VTE prophylaxis orders are present.        CODE STATUS:   Level Of Support Discussed With: Patient  Code Status (Patient has no pulse and is not breathing): CPR (Attempt to Resuscitate)  Medical Interventions (Patient has pulse or is breathing): Full Support    Disposition:  I expect patient to discharge home within 24 hours.    Electronically signed by MELANIA Williamson, 02/12/25, 8:40 AM EST.

## 2025-02-12 NOTE — CASE MANAGEMENT/SOCIAL WORK
Continued Stay Note  [facility]     Patient Name: Ivan Serrano  MRN: [id number]  Today's Date: 2/12/2025    Admit Date: 2/11/2025    Plan: home   Discharge Plan       Row Name 02/12/25 1204       Plan    Plan home    Patient/Family in Agreement with Plan yes    Plan Comments I met with this patient regarding his discharge home today. He is in agreement, and his wife can transport. He denies having any further discharge planning needs.    Final Discharge Disposition Code 01 - home or self-care      Row Name 02/12/25 1045       Plan    Plan home    Patient/Family in Agreement with Plan yes    Plan Comments I met with this patient bedside. He lives with his wife in [facility]. He is independent with activities of daily living and mobility. He anticipates returning home after this hospitalization, and his wife can transport. Case management will follow.    Final Discharge Disposition Code 01 - home or self-care                   Discharge Codes    No documentation.                 Expected Discharge Date and Time       Expected Discharge Date Expected Discharge Time    Feb 12, 2025               Pam Recio RN     I will START or STAY ON the medications listed below when I get home from the hospital:    acetaminophen 325 mg oral tablet  -- 2 tab(s) by mouth every 6 hours, As needed, Temp greater or equal to 38C (100.4F), Mild Pain (1 - 3)  -- Indication: For pain    omeprazole  -- Indication: For gerd

## 2025-02-12 NOTE — CASE MANAGEMENT/SOCIAL WORK
Discharge Planning Assessment  [facility]     Patient Name: Ivan Serrano  MRN: [id number]  Today's Date: 2/12/2025    Admit Date: 2/11/2025    Plan: home   Discharge Needs Assessment       Row Name 02/12/25 1045       Living Environment    People in Home spouse    Current Living Arrangements home    Primary Care Provided by self       Transition Planning    Patient/Family Anticipates Transition to home with family       Discharge Needs Assessment    Readmission Within the Last 30 Days no previous admission in last 30 days    Equipment Currently Used at Home none    Concerns to be Addressed basic needs;discharge planning                   Discharge Plan       Row Name 02/12/25 1045       Plan    Plan home    Patient/Family in Agreement with Plan yes    Plan Comments I met with this patient bedside. He lives with his wife in [facility]. He is independent with activities of daily living and mobility. He anticipates returning home after this hospitalization, and his wife can transport. Case management will follow.    Final Discharge Disposition Code 01 - home or self-care                  Continued Care and Services - Admitted Since 2/11/2025    No active coordination exists for this encounter.       Expected Discharge Date and Time       Expected Discharge Date Expected Discharge Time    Feb 14, 2025            Demographic Summary       Row Name 02/12/25 1044       General Information    General Information Comments I confirmed that Kira Daugherty is Mr Serrano's PCP and he has Medicare Ab and  for Life                   Functional Status       Row Name 02/12/25 1044       Functional Status, IADL    Medications independent    Meal Preparation independent    Housekeeping independent    Laundry independent    Shopping independent                   Psychosocial    No documentation.                  Abuse/Neglect    No documentation.                  Legal    No documentation.                  Substance  Abuse    No documentation.                  Patient Forms    No documentation.                     Pam Recio RN

## 2025-02-12 NOTE — DISCHARGE SUMMARY
Patient Name: Ivan Serrano  MRN: [id number]  : 1955  DOS: 2025    Attending: Susanne Koch MD    Primary Care Provider: Kira Daugherty APRN    Date of Admission:.2025  6:10 AM    Date of Discharge:  2025    Discharge Diagnosis:   S/P TURP    Benign localized prostatic hyperplasia with lower urinary tract symptoms (LUTS)    Hypertension    CAD (coronary artery disease)      Hospital Course    At admit:    Patient is a pleasant 69 y.o. male presented for scheduled surgery by . He underwent transurethral resection of prostate under general anesthesia.  He tolerated surgery well and was admitted for further medical management.  He reports difficulty with urination worsening over the past couple years. He continues to have significant urgency and frequency as well as weak stream and obstructive symptoms. He was treated with UroLift in 2019 and was somewhat better but then had return of his symptoms. He has been on Myrbetriq, finasteride, and tamsulosin.      When seen in PACU he is doing well.  He denies pain.  He denies nausea, shortness of breath or chest pain.  No history of DVT or PE.    After admit:    He was provided pain medication as needed for pain control, he received DVT prophylaxis with mechanicals.  Adjustments were made to pain medications to optimize postop pain management. Risks and benefits of opiate medications discussed with patient.  Dae report in chart was reviewed prior to discharge.    His home medications were resumed as appropriate.    He was provided a bowel regimen and was encouraged to ambulate frequently which he did.    During his stay he had evidence of bowel function return and he tolerated his by mouth diet well.    He had CBI infusing during the night. It was weaned off this morning. He continues to have a Arriaga catheter in place which he will keep attached to a leg bag until his follow-up appointment with urology.    When I saw him today  "he is doing quite well and he is ready for discharge home.      Procedures Performed    2025     Pre-op Diagnosis:   Benign localized prostatic hyperplasia with lower urinary tract symptoms (LUTS) [N40.1]     Post-op Diagnosis:     Post-Op Diagnosis Codes:     * Benign localized prostatic hyperplasia with lower urinary tract symptoms (LUTS) [N40.1]     Procedure/CPT® Codes:  WY TRURL ELECTROSURG RESCJ PROSTATE BLEED COMPLETE [02519]     Procedure(s):  CYSTOSCOPY BIPOLAR TRANSURETHRAL RESECTION OF PROSTATE     Surgeon(s):  Susanne Koch MD Schloegel, Van Ambrose, MD    Pertinent Test Results:    I reviewed the patient's new clinical results.   Results from last 7 days   Lab Units 25  1120 25  1431   WBC 10*3/mm3 7.19 6.23   HEMOGLOBIN g/dL 12.5* 13.2   HEMATOCRIT % 37.2* 39.1   PLATELETS 10*3/mm3 139* 156     Results from last 7 days   Lab Units 25  1431   SODIUM mmol/L 141   POTASSIUM mmol/L 4.5   CHLORIDE mmol/L 111*   CO2 mmol/L 22.0   BUN mg/dL 16   CREATININE mg/dL 1.07   CALCIUM mg/dL 8.6   GLUCOSE mg/dL 153*     I reviewed the patient's new imaging including images and reports.      Discharge Assessment:    Vital Signs  Visit Vitals  /65   Pulse 77   Temp 97.9 °F (36.6 °C) (Oral)   Resp 16   Ht 177.8 cm (70\")   Wt 102 kg (224 lb)   SpO2 94%   BMI 32.14 kg/m²     Temp (24hrs), Av.9 °F (36.6 °C), Min:97.8 °F (36.6 °C), Max:98.1 °F (36.7 °C)      General Appearance:    Alert, cooperative, in no acute distress   Lungs:     Clear to auscultation,respirations regular, even and unlabored    Heart:    Regular rhythm and normal rate, normal S1 and S2    Abdomen:   Soft, benign.  : Arriaga with pink urine   Extremities:   Moves all extremities well, no edema, no cyanosis, no redness   Pulses:   Pulses palpable and equal bilaterally   Skin:   No bleeding, bruising or rash          Discharge Disposition: Home    Discharge Medications     Discharge Medications        New Medications       "  Instructions Start Date   docusate sodium 100 MG capsule  Commonly known as: Colace   100 mg, Oral, 3 Times Daily PRN      phenazopyridine 200 MG tablet  Commonly known as: Pyridium   200 mg, Oral, 3 Times Daily PRN      sulfamethoxazole-trimethoprim 800-160 MG per tablet  Commonly known as: Bactrim DS   1 tablet, Oral, 2 Times Daily      traMADol 50 MG tablet  Commonly known as: Ultram   50 mg, Oral, Every 6 Hours PRN             Continue These Medications        Instructions Start Date   carvedilol 12.5 MG tablet  Commonly known as: COREG   12.5 mg, Oral, 2 Times Daily      Entresto 24-26 MG tablet  Generic drug: sacubitril-valsartan   1 tablet, Oral, 2 Times Daily      finasteride 5 MG tablet  Commonly known as: PROSCAR   5 mg, Oral, Daily      levothyroxine 175 MCG tablet  Commonly known as: SYNTHROID, LEVOTHROID   175 mcg, Daily      Myrbetriq 50 MG tablet sustained-release 24 hour 24 hr tablet  Generic drug: Mirabegron ER   50 mg, Oral, Daily      SUMAtriptan 100 MG tablet  Commonly known as: IMITREX   100 mg, Once As Needed      tamsulosin 0.4 MG capsule 24 hr capsule  Commonly known as: FLOMAX   0.4 mg, Oral, Daily      topiramate 50 MG tablet  Commonly known as: TOPAMAX   100 mg am 50 pm               Discharge Diet: Soft, bland diet    Activity at Discharge: ambulate    Follow-up Appointments  Dr. Koch per his orders       Radha Barron, MELANIA  02/12/25  11:50 EST

## 2025-02-13 ENCOUNTER — TELEPHONE (OUTPATIENT)
Dept: UROLOGY | Facility: CLINIC | Age: 70
End: 2025-02-13

## 2025-02-13 NOTE — TELEPHONE ENCOUNTER
"  Caller: Ivan Serrano \"Amari\"    Relationship: Self    Best call back number: [phone] (home)  (NO VM) [phone] (CAN LVM AT THIS #)    What is the best time to reach you: ANYTIME        What was the call regarding: PATIENT CALLED STATING THAT HE WAS NOT GIVEN A RX WHEN HE WAS DISCHARGED FROM THE HOSP YESTERDAY FOR BLADDER SPASMS.  HE IS CURRENTLY EXPERIENCING SPASMS AND REQUEST MEDICATION.  HE USES THE [facility] LISTED ON FILE.  PLEASE CALL PT TO ADVISE.  THANK YOU.      Is it okay if the provider responds through [employer]: NO    "

## 2025-02-14 ENCOUNTER — CLINICAL SUPPORT (OUTPATIENT)
Dept: UROLOGY | Facility: CLINIC | Age: 70
End: 2025-02-14
Payer: MEDICARE

## 2025-02-14 DIAGNOSIS — N40.1 BENIGN LOCALIZED PROSTATIC HYPERPLASIA WITH LOWER URINARY TRACT SYMPTOMS (LUTS): Primary | ICD-10-CM

## 2025-02-14 PROCEDURE — 51700 IRRIGATION OF BLADDER: CPT | Performed by: UROLOGY

## 2025-02-14 NOTE — PROGRESS NOTES
PT presented to our office for kenney catheter removal and voiding trial. PT is accompanied by his wife. PT's kenney catheter was draining orange urine with Clots. I first explained to the PT what I will be doing throughout the voiding trial and answered any questions PT and family may have. Then I removed the bag from the catheter and using a piston syringe, inserted 150 mL of sterile water into the PT's bladder. When PT notified me they began to feel a strong urge to urinate, I deflated the catheter balloon holding 25 mL of water. Once the balloon was empty, I removed the catheter in a smooth and gentle movement. I instructed the PT to try and urinate into the hand-held urinal . PT was able to void 150 mL of orange urine. PT tolerated well. I advised PT and family to drink lots of water, rest, call us if they are suddenly unable to urinate, or if large amounts of blood/clots are suddenly in urine. PT verbalized understanding.

## 2025-02-25 NOTE — PROGRESS NOTES
Subjective:     Encounter Date:02/26/2025    Primary Care Physician: Kira Daugherty APRN      Patient ID: Ivan Serrano is a 69 y.o. male.    Chief Complaint:Follow-up (1 year )    Problem List:     Nonischemic cardiomyopathy   09/27/22, PANTERA Chahal MD revealing mildly dilated LV with EF 35 %, mild AR, trace to mjld MR.  10/20/2022 [facility] no significant CAD EF 30%.  EF 55% 1/2023  COVID 19- January 2022 - high flow O2  Hypothyroidism  BPH  Low Back Pain  Migraine Headaches  Platelet Aggregation.  Obstructive Sleep Apnea- dental device.  Chronic Anemia  BPH  Surgeries:  Achilles tendon repair  Cholecystectomy  Septoplasty  Tonsillectomy  TURP      No Known Allergies      Current Outpatient Medications:     carvedilol (COREG) 12.5 MG tablet, TAKE 1 TABLET TWICE A DAY, Disp: 180 tablet, Rfl: 3    docusate sodium (Colace) 100 MG capsule, Take 1 capsule by mouth 3 (Three) Times a Day As Needed for Constipation., Disp: 30 capsule, Rfl: 1    Entresto 24-26 MG tablet, TAKE 1 TABLET TWICE A DAY, Disp: 180 tablet, Rfl: 3    finasteride (PROSCAR) 5 MG tablet, TAKE 1 TABLET DAILY, Disp: 90 tablet, Rfl: 3    levothyroxine (SYNTHROID, LEVOTHROID) 175 MCG tablet, Take 1 tablet by mouth Daily., Disp: , Rfl:     Myrbetriq 50 MG tablet sustained-release 24 hour 24 hr tablet, TAKE 1 TABLET DAILY, Disp: 90 tablet, Rfl: 3    phenazopyridine (Pyridium) 200 MG tablet, Take 1 tablet by mouth 3 (Three) Times a Day As Needed for Dysuria., Disp: 20 tablet, Rfl: 0    SUMAtriptan (IMITREX) 100 MG tablet, Take 1 tablet by mouth 1 (One) Time As Needed for Migraine., Disp: , Rfl:     tamsulosin (FLOMAX) 0.4 MG capsule 24 hr capsule, TAKE 1 CAPSULE DAILY, Disp: 90 capsule, Rfl: 3    topiramate (TOPAMAX) 50 MG tablet, 100 mg am 50 pm, Disp: , Rfl:     traMADol (Ultram) 50 MG tablet, Take 1 tablet by mouth Every 6 (Six) Hours As Needed for Severe Pain., Disp: 12 tablet, Rfl: 0        History of Present Illness    Patient returns for follow-up  "annually for coronary disease and nonischemic cardiomyopathy.  Since her last visit he is overall doing reasonly well.  He is active.  Does not no change in his chronic chest pain syndrome.  Does note with square dancing having some dyspnea on exertion and some intermittent chest discomfort which she thinks is new.    The following portions of the patient's history were reviewed and updated as appropriate: allergies, current medications, past family history, past medical history, past social history, past surgical history and problem list.      Social History     Tobacco Use    Smoking status: Never     Passive exposure: Never    Smokeless tobacco: Never   Vaping Use    Vaping status: Never Used   Substance Use Topics    Alcohol use: Never    Drug use: Never         ROS       Objective:   /72   Pulse 61   Ht 180.3 cm (71\")   Wt 101 kg (222 lb 3.2 oz)   SpO2 100%   BMI 30.99 kg/m²         Vitals reviewed.   Constitutional:       Appearance: Well-developed and not in distress.   Neck:      Thyroid: No thyromegaly.      Vascular: No carotid bruit or JVD.   Pulmonary:      Breath sounds: Normal breath sounds.   Cardiovascular:      Regular rhythm.      No gallop. No S3 and S4 gallop.   Pulses:     Intact distal pulses.      Carotid: 2+ bilaterally.     Radial: 2+ bilaterally.  Edema:     Peripheral edema absent.   Abdominal:      General: Bowel sounds are normal.      Palpations: Abdomen is soft. There is no abdominal mass.      Tenderness: There is no abdominal tenderness.   Musculoskeletal:         General: No deformity.      Extremities: No clubbing present.Skin:     General: Skin is warm and dry.      Findings: No rash.   Neurological:      Mental Status: Alert and oriented to person, place, and time.           ECG 12 Lead    Date/Time: 2/26/2025 12:58 PM  Performed by: Wili Joshua MD    Authorized by: Wili Joshua MD  Comparison: compared with previous ECG from 2/5/2025  Similar to previous " ECG  Rhythm: sinus rhythm    Clinical impression: normal ECG                Assessment:   Assessment & Plan      Diagnoses and all orders for this visit:    1. Coronary artery disease involving native coronary artery of native heart without angina pectoris (Primary)      1.  Nonischemic cardiomyopathy.  Last LVEF 55%.  On Entresto and beta-blocker.  No heart failure.  2.  New dyspnea on exertion.  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  Need to and sure not due to cardiomyopathy.  3.  Obstructive sleep apnea wearing dental device     Recommendations:  1.  Check echocardiogram  2.  Continue current GDMT.  3.  Revisit annually apparent symptom change    Advance Care Planning   ACP discussion was held with the patient during this visit. Patient has an advance directive in EMR which is still valid.  Patient does not have an advance directive, declines further assistance.        Dictated utilizing Dragon dictation

## 2025-02-26 ENCOUNTER — OFFICE VISIT (OUTPATIENT)
Dept: CARDIOLOGY | Facility: CLINIC | Age: 70
End: 2025-02-26
Payer: MEDICARE

## 2025-02-26 VITALS
DIASTOLIC BLOOD PRESSURE: 72 MMHG | OXYGEN SATURATION: 100 % | WEIGHT: 222.2 LBS | HEIGHT: 71 IN | BODY MASS INDEX: 31.11 KG/M2 | HEART RATE: 61 BPM | SYSTOLIC BLOOD PRESSURE: 115 MMHG

## 2025-02-26 DIAGNOSIS — I25.10 CORONARY ARTERY DISEASE INVOLVING NATIVE CORONARY ARTERY OF NATIVE HEART WITHOUT ANGINA PECTORIS: ICD-10-CM

## 2025-02-26 DIAGNOSIS — I42.0 DILATED CARDIOMYOPATHY: Primary | ICD-10-CM

## 2025-02-26 DIAGNOSIS — I25.10 CORONARY ARTERY DISEASE INVOLVING NATIVE CORONARY ARTERY OF NATIVE HEART WITHOUT ANGINA PECTORIS: Primary | ICD-10-CM

## 2025-02-26 PROCEDURE — 3078F DIAST BP <80 MM HG: CPT | Performed by: INTERNAL MEDICINE

## 2025-02-26 PROCEDURE — 1160F RVW MEDS BY RX/DR IN RCRD: CPT | Performed by: INTERNAL MEDICINE

## 2025-02-26 PROCEDURE — 99213 OFFICE O/P EST LOW 20 MIN: CPT | Performed by: INTERNAL MEDICINE

## 2025-02-26 PROCEDURE — 3074F SYST BP LT 130 MM HG: CPT | Performed by: INTERNAL MEDICINE

## 2025-02-26 PROCEDURE — 1159F MED LIST DOCD IN RCRD: CPT | Performed by: INTERNAL MEDICINE

## 2025-02-26 PROCEDURE — 93000 ELECTROCARDIOGRAM COMPLETE: CPT | Performed by: INTERNAL MEDICINE

## 2025-03-03 ENCOUNTER — TELEPHONE (OUTPATIENT)
Dept: UROLOGY | Facility: CLINIC | Age: 70
End: 2025-03-03
Payer: MEDICARE

## 2025-03-03 DIAGNOSIS — N40.0 BENIGN PROSTATIC HYPERPLASIA, UNSPECIFIED WHETHER LOWER URINARY TRACT SYMPTOMS PRESENT: Primary | ICD-10-CM

## 2025-03-03 RX ORDER — FINASTERIDE 5 MG/1
5 TABLET, FILM COATED ORAL DAILY
Qty: 90 TABLET | Refills: 3 | Status: SHIPPED | OUTPATIENT
Start: 2025-03-03 | End: 2026-02-26

## 2025-03-03 NOTE — TELEPHONE ENCOUNTER
Patient called and needed a 90 day supply refill of his finasteride sent in to [facility] in his chart.

## 2025-03-18 ENCOUNTER — OFFICE VISIT (OUTPATIENT)
Age: 70
End: 2025-03-18
Payer: MEDICARE

## 2025-03-18 DIAGNOSIS — N40.1 BENIGN LOCALIZED PROSTATIC HYPERPLASIA WITH LOWER URINARY TRACT SYMPTOMS (LUTS): Primary | ICD-10-CM

## 2025-03-18 LAB
BILIRUB BLD-MCNC: NEGATIVE MG/DL
CLARITY, POC: CLEAR
COLOR UR: YELLOW
EXPIRATION DATE: ABNORMAL
GLUCOSE UR STRIP-MCNC: NEGATIVE MG/DL
KETONES UR QL: NEGATIVE
LEUKOCYTE EST, POC: ABNORMAL
Lab: ABNORMAL
NITRITE UR-MCNC: NEGATIVE MG/ML
PH UR: 6 [PH] (ref 5–8)
PROT UR STRIP-MCNC: ABNORMAL MG/DL
RBC # UR STRIP: ABNORMAL /UL
SP GR UR: 1.01 (ref 1–1.03)
UROBILINOGEN UR QL: ABNORMAL

## 2025-03-18 PROCEDURE — 87086 URINE CULTURE/COLONY COUNT: CPT | Performed by: UROLOGY

## 2025-03-18 NOTE — PROGRESS NOTES
Follow Up Office Visit      Patient Name: Ivan Serrano  : 1955   MRN: [id number]     Chief Complaint:    Chief Complaint   Patient presents with    Benign localized prostatic hyperplasia with lower urinary t       Referring Provider: Kira Daugherty A*    History of Present Illness: Ivan Serrano is a 70 y.o. male who presents today for follow up after TURP.  He reports he is doing better but continues to have urgency and frequency.  We have removed all of his urolift tabs.    His PVR is 0cc.    He reports he no longer is double voiding and feels completely empty.  However, he states he voids only small amounts frequently.  He reports no significant leakage. He thinks he is continuing to improve.  One cup of coffee in the morning and then drinks water through the day.  He drinks about 4 glasses of water through the day.      Subjective      Review of System:   Review of Systems   I have reviewed the ROS documented by my clinical staff, I have updated appropriately and I agree. Susanne Kcoh MD    I have reviewed and the following portions of the patient's history were updated as appropriate: past family history, past medical history, past social history, past surgical history and problem list.    Past Medical History:   Past Medical History:   Diagnosis Date    Anemia     Benign localized prostatic hyperplasia with lower urinary tract symptoms (LUTS) 2023    BPH (benign prostatic hyperplasia)     Chronic prostatitis     Clotting disorder     Coronary artery disease     Elevated PSA     Erectile dysfunction     GERD (gastroesophageal reflux disease)     Hypertension 2025    Hypothyroidism     Nonischemic cardiomyopathy     KEVIN (obstructive sleep apnea)     WEARS DENTAL DEVICE    Platelet disorder     Urgency of urination     Urinary incontinence     Urinary tract infection        Past Surgical History:  Past Surgical History:   Procedure Laterality Date    ACHILLES TENDON  REPAIR      APPENDECTOMY      CARDIAC CATHETERIZATION Left 10/20/2022    Procedure: Left Heart Cath;  Surgeon: Wili Joshua MD;  Location:  [facility];  Service: Cardiovascular;  Laterality: Left;    CHOLECYSTECTOMY      COLONOSCOPY      CYSTOSCOPY      CYSTOSCOPY TRANSURETHRAL RESECTION OF PROSTATE N/A 2/11/2025    Procedure: CYSTOSCOPY BIPOLAR TRANSURETHRAL RESECTION OF PROSTATE;  Surgeon: Susanne Koch MD;  Location:  [facility];  Service: Urology;  Laterality: N/A;    INTERSTIM PERC TEST N/A 06/22/2023    Procedure: PERIPHERAL NERVE EVALUATION FOR OVERACTIV BLADDER;  Surgeon: Susanne Koch MD;  Location:  [facility];  Service: Urology;  Laterality: N/A;    SEPTOPLASTY      TONSILLECTOMY         Family History:   family history includes Heart failure in his father; No Known Problems in his mother.   Otherwise pertinent FHx was reviewed and not pertinent to current issue.    Social History:    reports that he has never smoked. He has never been exposed to tobacco smoke. He has never used smokeless tobacco. He reports that he does not drink alcohol and does not use drugs.    Medications:     Current Outpatient Medications:     carvedilol (COREG) 12.5 MG tablet, TAKE 1 TABLET TWICE A DAY, Disp: 180 tablet, Rfl: 3    docusate sodium (Colace) 100 MG capsule, Take 1 capsule by mouth 3 (Three) Times a Day As Needed for Constipation., Disp: 30 capsule, Rfl: 1    Entresto 24-26 MG tablet, TAKE 1 TABLET TWICE A DAY, Disp: 180 tablet, Rfl: 3    finasteride (PROSCAR) 5 MG tablet, Take 1 tablet by mouth Daily for 360 days., Disp: 90 tablet, Rfl: 3    levothyroxine (SYNTHROID, LEVOTHROID) 175 MCG tablet, Take 1 tablet by mouth Daily., Disp: , Rfl:     Myrbetriq 50 MG tablet sustained-release 24 hour 24 hr tablet, TAKE 1 TABLET DAILY, Disp: 90 tablet, Rfl: 3    phenazopyridine (Pyridium) 200 MG tablet, Take 1 tablet by mouth 3 (Three) Times a Day As Needed for Dysuria., Disp: 20 tablet, Rfl: 0    SUMAtriptan  (IMITREX) 100 MG tablet, Take 1 tablet by mouth 1 (One) Time As Needed for Migraine., Disp: , Rfl:     tamsulosin (FLOMAX) 0.4 MG capsule 24 hr capsule, TAKE 1 CAPSULE DAILY, Disp: 90 capsule, Rfl: 3    topiramate (TOPAMAX) 50 MG tablet, 100 mg am 50 pm, Disp: , Rfl:     traMADol (Ultram) 50 MG tablet, Take 1 tablet by mouth Every 6 (Six) Hours As Needed for Severe Pain., Disp: 12 tablet, Rfl: 0    Allergies:   No Known Allergies    IPSS Questionnaire (AUA-7):  Over the past month…    1)  Incomplete Emptying  How often have you had a sensation of not emptying your bladder?  0 - Not at all   2)  Frequency  How often have you had to urinate less than every two hours? 4 - More than half the time   3)  Intermittency  How often have you found you stopped and started again several times when you urinated?  1 - Less than 1 time in 5   4) Urgency  How often have you found it difficult to postpone urination?  4 - More than half the time   5) Weak Stream  How often have you had a weak urinary stream?  2 - Less than half the time   6) Straining  How often have you had to push or strain to begin urination?  2 - Less than half the time   7) Nocturia  How many times did you typically get up at night to urinate?  1 - 1 time   Total Score:  14   The International Prostate Symptom Score (IPSS) is used to screen, diagnose, track symptoms of benign prostatic hyperplasia (BPH).    0-7 pts (Mild Symptoms)  / 8-19 pts (Moderate) / 20-35 (Severe)    Quality of life due to urinary symptoms:  If you were to spend the rest of your life with your urinary condition the way it is now, how would you feel about that? 3-4  Mixed - mostly dissatisfied   Urine Leakage (Incontinence) 0-No Leakage     Post void residual bladder scan:   0ml     Objective     Physical Exam:   Vital Signs: There were no vitals filed for this visit.  There is no height or weight on file to calculate BMI.     Physical Exam  Vitals and nursing note reviewed.    Constitutional:       General: He is awake. He is not in acute distress.     Appearance: Normal appearance. He is well-developed.   HENT:      Head: Normocephalic and atraumatic.      Right Ear: External ear normal.      Left Ear: External ear normal.      Nose: Nose normal.   Eyes:      Conjunctiva/sclera: Conjunctivae normal.   Pulmonary:      Effort: Pulmonary effort is normal.   Abdominal:      General: There is no distension.      Palpations: Abdomen is soft. There is no mass.      Tenderness: There is no abdominal tenderness. There is no right CVA tenderness, left CVA tenderness, guarding or rebound.      Hernia: No hernia is present. There is no hernia in the left inguinal area or right inguinal area.   Genitourinary:     Pubic Area: No rash.       Penis: Normal.       Testes: Normal.      Prostate: Normal.      Rectum: Normal. No mass or tenderness. Normal anal tone.   Musculoskeletal:      Cervical back: Normal range of motion.   Lymphadenopathy:      Lower Body: No right inguinal adenopathy. No left inguinal adenopathy.   Skin:     General: Skin is warm.   Neurological:      General: No focal deficit present.      Mental Status: He is alert and oriented to person, place, and time.   Psychiatric:         Behavior: Behavior normal. Behavior is cooperative.         Labs:   Brief Urine Lab Results  (Last result in the past 365 days)        Color   Clarity   Blood   Leuk Est   Nitrite   Protein   CREAT   Urine HCG        03/18/25 1551 Yellow   Clear   2+   Small (1+)   Negative   Trace                   Urine Culture          2/11/2025    07:56   Urine Culture   Urine Culture No growth         Lab Results   Component Value Date    GLUCOSE 100 (H) 02/12/2025    CALCIUM 8.6 02/12/2025     02/12/2025    K 3.9 02/12/2025    CO2 22.0 02/12/2025     (H) 02/12/2025    BUN 15 02/12/2025    CREATININE 0.90 02/12/2025    EGFRIFAFRI >60 01/13/2022    EGFRIFNONA >60 01/13/2022    BCR 16.7 02/12/2025     ANIONGAP 7.0 02/12/2025       Lab Results   Component Value Date    WBC 5.67 03/03/2025    HGB 12.6 (L) 03/03/2025    HCT 37.8 (L) 03/03/2025    MCV 87 03/03/2025     03/03/2025       Lab Results   Component Value Date    PSA 2.380 10/16/2024    PSA 3.100 03/29/2023    PSA 3.930 10/27/2022       Images:   No Images in the past 120 days found..    Measures:   Tobacco:   Ivan Serrano  reports that he has never smoked. He has never been exposed to tobacco smoke. He has never used smokeless tobacco.       Urine Incontinence: Patient reports that he is not currently experiencing any symptoms of urinary incontinence.         Assessment / Plan      Assessment/Plan:   70 y.o. male who presented today for follow up after TURP.  He continues to complain of urgency and frequency.  He is on tamsulosin, finasteride, and myrbetriq.  He will stop his finasteride.  We will continue tamsulosin and myrbetriq.  He reports he is getting better.  However, I feel he is not seeing his symptoms clearly.  I will have him fill out a bladder diary.  I think this will allow him to visualize what his symptoms truly are.  I will see him back in 3 months.     Diagnoses and all orders for this visit:    1. Benign localized prostatic hyperplasia with lower urinary tract symptoms (LUTS) (Primary)  -     POC Urinalysis Dipstick, Automated         Follow Up:   Return in about 3 months (around 6/18/2025) for Recheck.    I spent approximately 30 minutes providing clinical care for this patient; including review of patient's chart and provider documentation, face to face time spent with patient in examination room (obtaining history, performing physical exam, discussing diagnosis and management options), placing orders, and completing patient documentation.     Susanne Koch MD  [facility] [place]

## 2025-03-20 LAB — BACTERIA SPEC AEROBE CULT: NO GROWTH

## 2025-04-01 ENCOUNTER — HOSPITAL ENCOUNTER (OUTPATIENT)
Dept: CARDIOLOGY | Facility: HOSPITAL | Age: 70
Discharge: HOME | End: 2025-04-01
Admitting: INTERNAL MEDICINE
Payer: MEDICARE

## 2025-04-01 DIAGNOSIS — I42.0 DILATED CARDIOMYOPATHY: ICD-10-CM

## 2025-04-01 DIAGNOSIS — I25.10 CORONARY ARTERY DISEASE INVOLVING NATIVE CORONARY ARTERY OF NATIVE HEART WITHOUT ANGINA PECTORIS: ICD-10-CM

## 2025-04-01 LAB
AORTIC DIMENSIONLESS INDEX: 0.67 (DI)
AV MEAN PRESS GRAD SYS DOP V1V2: 3 MMHG
AV VMAX SYS DOP: 120 CM/SEC
BH CV ECHO MEAS - AI P1/2T: 661.7 MSEC
BH CV ECHO MEAS - AO MAX PG: 5.8 MMHG
BH CV ECHO MEAS - AO ROOT DIAM: 2.9 CM
BH CV ECHO MEAS - AO V2 VTI: 25 CM
BH CV ECHO MEAS - AVA(I,D): 2.8 CM2
BH CV ECHO MEAS - EDV(CUBED): 110.6 ML
BH CV ECHO MEAS - EDV(MOD-SP2): 72.4 ML
BH CV ECHO MEAS - EDV(MOD-SP4): 95.5 ML
BH CV ECHO MEAS - EF(MOD-SP2): 53.7 %
BH CV ECHO MEAS - EF(MOD-SP4): 51.9 %
BH CV ECHO MEAS - ESV(CUBED): 28.9 ML
BH CV ECHO MEAS - ESV(MOD-SP2): 33.5 ML
BH CV ECHO MEAS - ESV(MOD-SP4): 45.9 ML
BH CV ECHO MEAS - FS: 36.1 %
BH CV ECHO MEAS - IVS/LVPW: 0.9 CM
BH CV ECHO MEAS - IVSD: 0.9 CM
BH CV ECHO MEAS - LA DIMENSION: 4 CM
BH CV ECHO MEAS - LAT PEAK E' VEL: 10.2 CM/SEC
BH CV ECHO MEAS - LV DIASTOLIC VOL/BSA (35-75): 43.3 CM2
BH CV ECHO MEAS - LV MASS(C)D: 158.8 GRAMS
BH CV ECHO MEAS - LV MAX PG: 2.7 MMHG
BH CV ECHO MEAS - LV MEAN PG: 1 MMHG
BH CV ECHO MEAS - LV SYSTOLIC VOL/BSA (12-30): 20.8 CM2
BH CV ECHO MEAS - LV V1 MAX: 82.7 CM/SEC
BH CV ECHO MEAS - LV V1 VTI: 16.8 CM
BH CV ECHO MEAS - LVIDD: 4.8 CM
BH CV ECHO MEAS - LVIDS: 3.1 CM
BH CV ECHO MEAS - LVOT AREA: 4.2 CM2
BH CV ECHO MEAS - LVOT DIAM: 2.3 CM
BH CV ECHO MEAS - LVPWD: 1 CM
BH CV ECHO MEAS - MED PEAK E' VEL: 6.5 CM/SEC
BH CV ECHO MEAS - MV A MAX VEL: 76.3 CM/SEC
BH CV ECHO MEAS - MV DEC SLOPE: 182.2 CM/SEC2
BH CV ECHO MEAS - MV DEC TIME: 0.23 SEC
BH CV ECHO MEAS - MV E MAX VEL: 52.3 CM/SEC
BH CV ECHO MEAS - MV E/A: 0.69
BH CV ECHO MEAS - MV MAX PG: 2.9 MMHG
BH CV ECHO MEAS - MV MEAN PG: 1.11 MMHG
BH CV ECHO MEAS - MV P1/2T: 120.9 MSEC
BH CV ECHO MEAS - MV V2 VTI: 25.9 CM
BH CV ECHO MEAS - MVA(P1/2T): 1.82 CM2
BH CV ECHO MEAS - MVA(VTI): 2.7 CM2
BH CV ECHO MEAS - PA ACC TIME: 0.1 SEC
BH CV ECHO MEAS - RAP SYSTOLE: 3 MMHG
BH CV ECHO MEAS - RVSP: 25.3 MMHG
BH CV ECHO MEAS - SV(LVOT): 70 ML
BH CV ECHO MEAS - SV(MOD-SP2): 38.9 ML
BH CV ECHO MEAS - SV(MOD-SP4): 49.6 ML
BH CV ECHO MEAS - SVI(LVOT): 31.8 ML/M2
BH CV ECHO MEAS - SVI(MOD-SP2): 17.6 ML/M2
BH CV ECHO MEAS - SVI(MOD-SP4): 22.5 ML/M2
BH CV ECHO MEAS - TAPSE (>1.6): 1.98 CM
BH CV ECHO MEAS - TR MAX PG: 22.3 MMHG
BH CV ECHO MEAS - TR MAX VEL: 235.7 CM/SEC
BH CV ECHO MEASUREMENTS AVERAGE E/E' RATIO: 6.26
BH CV XLRA - RV BASE: 4.4 CM
BH CV XLRA - RV LENGTH: 7.6 CM
BH CV XLRA - RV MID: 4.1 CM
BH CV XLRA - TDI S': 10.4 CM/SEC
LEFT ATRIUM VOLUME INDEX: 24.6 ML/M2
LV EF 2D ECHO EST: 60 %
LV EF BIPLANE MOD: 53.8 %

## 2025-04-01 PROCEDURE — 93306 TTE W/DOPPLER COMPLETE: CPT

## 2025-04-21 DIAGNOSIS — N40.1 BENIGN LOCALIZED PROSTATIC HYPERPLASIA WITH LOWER URINARY TRACT SYMPTOMS (LUTS): ICD-10-CM

## 2025-04-21 RX ORDER — TAMSULOSIN HYDROCHLORIDE 0.4 MG/1
1 CAPSULE ORAL DAILY
Qty: 90 CAPSULE | Refills: 3 | Status: SHIPPED | OUTPATIENT
Start: 2025-04-21

## 2025-04-21 NOTE — TELEPHONE ENCOUNTER
Rx Refill Note  Requested Prescriptions     Pending Prescriptions Disp Refills    tamsulosin (FLOMAX) 0.4 MG capsule 24 hr capsule [Pharmacy Med Name: TAMSULOSIN HCL CAPS 0.4MG] 90 capsule 3     Sig: TAKE 1 CAPSULE DAILY      Last office visit with prescribing clinician: 3/18/2025   Next office visit with prescribing clinician: 6/17/2025       Tonia Chinchilla MA  04/21/25, 07:38 EDT

## 2025-06-17 ENCOUNTER — OFFICE VISIT (OUTPATIENT)
Age: 70
End: 2025-06-17
Payer: MEDICARE

## 2025-06-17 VITALS — HEIGHT: 71 IN | BODY MASS INDEX: 31.08 KG/M2 | WEIGHT: 222 LBS

## 2025-06-17 DIAGNOSIS — N40.1 BENIGN LOCALIZED PROSTATIC HYPERPLASIA WITH LOWER URINARY TRACT SYMPTOMS (LUTS): Primary | ICD-10-CM

## 2025-06-17 RX ORDER — TADALAFIL 10 MG/1
10 TABLET ORAL AS NEEDED
Qty: 20 TABLET | Refills: 1 | Status: SHIPPED | OUTPATIENT
Start: 2025-06-17

## 2025-06-17 NOTE — PROGRESS NOTES
Follow Up Office Visit      Patient Name: Ivan Serrano  : 1955   MRN: [id number]     Chief Complaint:    Chief Complaint   Patient presents with    Benign localized prostatic hyperplasia with lower urinary t       Referring Provider: Kira Daugherty A*    History of Present Illness: Ivan Serrano is a 70 y.o. male who presents today for follow up after TURP.  He reports he has done well since his last visit. Urgency and frequency has improved.  He reports he is not having the urge incontinence any longer.  He is not wearing diaper or pads any longer.   He is still on myrbetriq and tamsulosin.      I reviewed his bladder diary and he is going every 90 minutes or so but has made greater than 2300 cc    Subjective      Review of System:   Review of Systems   I have reviewed the ROS documented by my clinical staff, I have updated appropriately and I agree. Susanne Koch MD    I have reviewed and the following portions of the patient's history were updated as appropriate: past family history, past medical history, past social history, past surgical history and problem list.    Past Medical History:   Past Medical History:   Diagnosis Date    Anemia     Benign localized prostatic hyperplasia with lower urinary tract symptoms (LUTS) 2023    BPH (benign prostatic hyperplasia)     Chronic prostatitis     Clotting disorder     Coronary artery disease     Elevated PSA     Erectile dysfunction     GERD (gastroesophageal reflux disease)     Hypertension 2025    Hypothyroidism     Nonischemic cardiomyopathy     KEVIN (obstructive sleep apnea)     WEARS DENTAL DEVICE    Platelet disorder     Urgency of urination     Urinary incontinence     Urinary tract infection        Past Surgical History:  Past Surgical History:   Procedure Laterality Date    ACHILLES TENDON REPAIR      APPENDECTOMY      CARDIAC CATHETERIZATION Left 10/20/2022    Procedure: Left Heart Cath;  Surgeon: Wili Joshua  MD;  Location: [facility] CATH INVASIVE LOCATION;  Service: Cardiovascular;  Laterality: Left;    CHOLECYSTECTOMY      COLONOSCOPY      CYSTOSCOPY      CYSTOSCOPY TRANSURETHRAL RESECTION OF PROSTATE N/A 2/11/2025    Procedure: CYSTOSCOPY BIPOLAR TRANSURETHRAL RESECTION OF PROSTATE;  Surgeon: Susanne Koch MD;  Location: [facility] OR;  Service: Urology;  Laterality: N/A;    INTERSTIM PERC TEST N/A 06/22/2023    Procedure: PERIPHERAL NERVE EVALUATION FOR OVERACTIV BLADDER;  Surgeon: Susanne Koch MD;  Location: [facility] OR;  Service: Urology;  Laterality: N/A;    SEPTOPLASTY      TONSILLECTOMY         Family History:   family history includes Heart failure in his father; No Known Problems in his mother.   Otherwise pertinent FHx was reviewed and not pertinent to current issue.    Social History:    reports that he has never smoked. He has never been exposed to tobacco smoke. He has never used smokeless tobacco. He reports that he does not drink alcohol and does not use drugs.    Medications:     Current Outpatient Medications:     carvedilol (COREG) 12.5 MG tablet, TAKE 1 TABLET TWICE A DAY, Disp: 180 tablet, Rfl: 3    docusate sodium (Colace) 100 MG capsule, Take 1 capsule by mouth 3 (Three) Times a Day As Needed for Constipation., Disp: 30 capsule, Rfl: 1    Entresto 24-26 MG tablet, TAKE 1 TABLET TWICE A DAY, Disp: 180 tablet, Rfl: 3    finasteride (PROSCAR) 5 MG tablet, Take 1 tablet by mouth Daily for 360 days., Disp: 90 tablet, Rfl: 3    levothyroxine (SYNTHROID, LEVOTHROID) 175 MCG tablet, Take 1 tablet by mouth Daily., Disp: , Rfl:     Myrbetriq 50 MG tablet sustained-release 24 hour 24 hr tablet, TAKE 1 TABLET DAILY, Disp: 90 tablet, Rfl: 3    phenazopyridine (Pyridium) 200 MG tablet, Take 1 tablet by mouth 3 (Three) Times a Day As Needed for Dysuria., Disp: 20 tablet, Rfl: 0    SUMAtriptan (IMITREX) 100 MG tablet, Take 1 tablet by mouth 1 (One) Time As Needed for Migraine., Disp: , Rfl:     tamsulosin (FLOMAX) 0.4  "MG capsule 24 hr capsule, TAKE 1 CAPSULE DAILY, Disp: 90 capsule, Rfl: 3    topiramate (TOPAMAX) 50 MG tablet, 100 mg am 50 pm, Disp: , Rfl:     traMADol (Ultram) 50 MG tablet, Take 1 tablet by mouth Every 6 (Six) Hours As Needed for Severe Pain., Disp: 12 tablet, Rfl: 0    tadalafil (Cialis) 10 MG tablet, Take 1 tablet by mouth As Needed for Erectile Dysfunction., Disp: 20 tablet, Rfl: 1    Allergies:   No Known Allergies    IPSS Questionnaire (AUA-7):  Over the past month…    1)  Incomplete Emptying  How often have you had a sensation of not emptying your bladder?  1 - Less than 1 time in 5   2)  Frequency  How often have you had to urinate less than every two hours? 3 - About half the time   3)  Intermittency  How often have you found you stopped and started again several times when you urinated?  1 - Less than 1 time in 5   4) Urgency  How often have you found it difficult to postpone urination?  0 - Not at all   5) Weak Stream  How often have you had a weak urinary stream?  1 - Less than 1 time in 5   6) Straining  How often have you had to push or strain to begin urination?  1 - Less than 1 time in 5   7) Nocturia  How many times did you typically get up at night to urinate?  1 - 1 time   Total Score:  9   The International Prostate Symptom Score (IPSS) is used to screen, diagnose, track symptoms of benign prostatic hyperplasia (BPH).    0-7 pts (Mild Symptoms)  / 8-19 pts (Moderate) / 20-35 (Severe)    Quality of life due to urinary symptoms:  If you were to spend the rest of your life with your urinary condition the way it is now, how would you feel about that? 2-Mostly Satisfied   Urine Leakage (Incontinence) 1-Mild (A few drops a day, no pad use)       Objective     Physical Exam:   Vital Signs:   Vitals:    06/17/25 1551   Weight: 101 kg (222 lb)   Height: 180.3 cm (70.98\")   PainSc: 0-No pain     Body mass index is 30.98 kg/m².     Physical Exam    Labs:   Brief Urine Lab Results  (Last result in the " past 365 days)        Color   Clarity   Blood   Leuk Est   Nitrite   Protein   CREAT   Urine HCG        03/18/25 1551 Yellow   Clear   2+   Small (1+)   Negative   Trace                   Urine Culture          2/11/2025    07:56 3/18/2025    16:03   Urine Culture   Urine Culture No growth  No growth         Lab Results   Component Value Date    GLUCOSE 100 (H) 02/12/2025    CALCIUM 8.6 02/12/2025     02/12/2025    K 3.9 02/12/2025    CO2 22.0 02/12/2025     (H) 02/12/2025    BUN 15 02/12/2025    CREATININE 0.90 02/12/2025    EGFRIFAFRI >60 01/13/2022    EGFRIFNONA >60 01/13/2022    BCR 16.7 02/12/2025    ANIONGAP 7.0 02/12/2025       Lab Results   Component Value Date    WBC 5.67 03/03/2025    HGB 12.6 (L) 03/03/2025    HCT 37.8 (L) 03/03/2025    MCV 87 03/03/2025     03/03/2025       Lab Results   Component Value Date    PSA 2.380 10/16/2024    PSA 3.100 03/29/2023    PSA 3.930 10/27/2022       Images:   Doppler Ankle Brachial Index Single Level CAR  Result Date: 4/4/2025  Right: Normal study; No evidence of hemodynamically significant arterial disease at rest. Left: Normal study; No evidence of hemodynamically significant arterial disease at rest. COMMUNICATION: Per this written report. Preliminary report signed by Jerrica Taylor on 4/4/2025 10:02 AM By electronically signing this report, I, the attending physician, attest that I have personally reviewed the images/data for the above examination(s) and I agree with the final edited report. Drafted by Jerrica Taylor on 4/4/2025 9:42 AM Final report signed by Deandre Gomez MD on 4/4/2025 12:48 PM      Measures:   Tobacco:   Ivan Serrano  reports that he has never smoked. He has never been exposed to tobacco smoke. He has never used smokeless tobacco.       Urine Incontinence: Patient reports that he is not currently experiencing any symptoms of urinary incontinence.         Assessment / Plan      Assessment/Plan:   70 y.o. male who  presented today for follow up of after TURP.  He is now doing better.  We discussed cutting back on his fluid intake.  He is making close to 2.5 L.  We will try and stop his tamsulosin.  I will see him back in       Diagnoses and all orders for this visit:    1. Benign localized prostatic hyperplasia with lower urinary tract symptoms (LUTS) (Primary)  -     tadalafil (Cialis) 10 MG tablet; Take 1 tablet by mouth As Needed for Erectile Dysfunction.  Dispense: 20 tablet; Refill: 1         Follow Up:   Return in about 6 months (around 12/17/2025) for Recheck.    I spent approximately 45 minutes providing clinical care for this patient; including review of patient's chart and provider documentation, face to face time spent with patient in examination room (obtaining history, performing physical exam, discussing diagnosis and management options), placing orders, and completing patient documentation.     Susanne Koch MD  [facility] [place]

## (undated) DEVICE — ANGIO-SEAL VIP VASCULAR CLOSURE DEVICE: Brand: ANGIO-SEAL

## (undated) DEVICE — RADIFOCUS GLIDEWIRE: Brand: GLIDEWIRE

## (undated) DEVICE — DRAINBAG,ANTI-REFLUX TOWER,L/F,2000ML,LL: Brand: MEDLINE

## (undated) DEVICE — GLV SURG BIOGEL LTX PF 7

## (undated) DEVICE — PK CYSTO-TUR BASIC 10

## (undated) DEVICE — NDL PERC 1PRT THNWALL W/BASEPLT 18G 7CM

## (undated) DEVICE — PK CATH CARD 10

## (undated) DEVICE — GW PERIPH VASC ADX J/TP SS .035 150CM 3MM

## (undated) DEVICE — EVAC BLDR UROVAC W ADAPT

## (undated) DEVICE — CUTTING LOOP, BIPOLAR, 24/26 FR.: Brand: N.A.

## (undated) DEVICE — SYR LUERLOK 30CC

## (undated) DEVICE — PLUG,CATHETER,DRAINAGE PROTECTOR,TUBE: Brand: MEDLINE

## (undated) DEVICE — GW PERIPH GUIDERIGHT STD/EXCHNG/J/TIP SS 0.035IN 5X260CM

## (undated) DEVICE — CATH FOL BARDEX HEMATURIA 3WY 22F 30CC

## (undated) DEVICE — DEV COMP RAD PRELUDESYNC 24CM

## (undated) DEVICE — CATH DIAG EXPO M/ PK 6FR FL4/FR4 PIG 3PK

## (undated) DEVICE — NDL ANGIOGR ADV THN SMOTH SGLWALL 21G 1.5

## (undated) DEVICE — CATH DIAG EXPO .056 FL3.5 6F 100CM

## (undated) DEVICE — PINNACLE INTRODUCER SHEATH: Brand: PINNACLE

## (undated) DEVICE — MODEL BT2000 P/N 700287-012KIT CONTENTS: MANIFOLD WITH SALINE AND CONTRAST PORTS, SALINE TUBING WITH SPIKE AND HAND SYRINGE, TRANSDUCER: Brand: BT2000 AUTOMATED MANIFOLD KIT

## (undated) DEVICE — GLIDESHEATH BASIC HYDROPHILIC COATED INTRODUCER SHEATH: Brand: GLIDESHEATH

## (undated) DEVICE — CVR FTSWITCH UNIV

## (undated) DEVICE — SYR LL TP 10ML STRL